# Patient Record
Sex: MALE | Race: WHITE | NOT HISPANIC OR LATINO | URBAN - METROPOLITAN AREA
[De-identification: names, ages, dates, MRNs, and addresses within clinical notes are randomized per-mention and may not be internally consistent; named-entity substitution may affect disease eponyms.]

---

## 2017-08-31 ENCOUNTER — INPATIENT (INPATIENT)
Facility: HOSPITAL | Age: 67
LOS: 14 days | Discharge: NURSING, REHAB OR LONG-TERM CARE | End: 2017-09-15
Attending: INTERNAL MEDICINE | Admitting: SURGERY
Payer: MEDICARE

## 2017-08-31 VITALS
RESPIRATION RATE: 24 BRPM | OXYGEN SATURATION: 100 % | HEART RATE: 127 BPM | DIASTOLIC BLOOD PRESSURE: 79 MMHG | HEIGHT: 70 IN | SYSTOLIC BLOOD PRESSURE: 154 MMHG | WEIGHT: 169.98 LBS

## 2017-08-31 DIAGNOSIS — Z98.890 OTHER SPECIFIED POSTPROCEDURAL STATES: Chronic | ICD-10-CM

## 2017-08-31 LAB
ALBUMIN SERPL ELPH-MCNC: 2.5 G/DL — LOW (ref 3.3–5)
ALP SERPL-CCNC: 208 U/L — HIGH (ref 40–120)
ALT FLD-CCNC: 18 U/L — SIGNIFICANT CHANGE UP (ref 12–78)
ANION GAP SERPL CALC-SCNC: 6 MMOL/L — SIGNIFICANT CHANGE UP (ref 5–17)
ANISOCYTOSIS BLD QL: SLIGHT — SIGNIFICANT CHANGE UP
APTT BLD: 29.8 SEC — SIGNIFICANT CHANGE UP (ref 27.5–37.4)
AST SERPL-CCNC: 16 U/L — SIGNIFICANT CHANGE UP (ref 15–37)
BASO STIPL BLD QL SMEAR: SLIGHT — SIGNIFICANT CHANGE UP
BILIRUB SERPL-MCNC: 0.8 MG/DL — SIGNIFICANT CHANGE UP (ref 0.2–1.2)
BLD GP AB SCN SERPL QL: SIGNIFICANT CHANGE UP
BUN SERPL-MCNC: 41 MG/DL — HIGH (ref 7–23)
CALCIUM SERPL-MCNC: 8.8 MG/DL — SIGNIFICANT CHANGE UP (ref 8.5–10.1)
CHLORIDE SERPL-SCNC: 93 MMOL/L — LOW (ref 96–108)
CO2 SERPL-SCNC: 36 MMOL/L — HIGH (ref 22–31)
CREAT SERPL-MCNC: 1.97 MG/DL — HIGH (ref 0.5–1.3)
ELLIPTOCYTES BLD QL SMEAR: SLIGHT — SIGNIFICANT CHANGE UP
GLUCOSE SERPL-MCNC: 175 MG/DL — HIGH (ref 70–99)
HCT VFR BLD CALC: 34 % — LOW (ref 39–50)
HGB BLD-MCNC: 10.7 G/DL — LOW (ref 13–17)
INR BLD: 1.29 RATIO — HIGH (ref 0.88–1.16)
LACTATE SERPL-SCNC: 1.7 MMOL/L — SIGNIFICANT CHANGE UP (ref 0.7–2)
LG PLATELETS BLD QL AUTO: SLIGHT — SIGNIFICANT CHANGE UP
MACROCYTES BLD QL: SLIGHT — SIGNIFICANT CHANGE UP
MANUAL DIF COMMENT BLD-IMP: SIGNIFICANT CHANGE UP
MCHC RBC-ENTMCNC: 31.5 GM/DL — LOW (ref 32–36)
MCHC RBC-ENTMCNC: 33.2 PG — SIGNIFICANT CHANGE UP (ref 27–34)
MCV RBC AUTO: 105.2 FL — HIGH (ref 80–100)
MICROCYTES BLD QL: SLIGHT — SIGNIFICANT CHANGE UP
MONOCYTES NFR BLD AUTO: 6 % — SIGNIFICANT CHANGE UP (ref 2–14)
NEUTROPHILS NFR BLD AUTO: 90 % — HIGH (ref 43–77)
NEUTS BAND # BLD: 4 % — SIGNIFICANT CHANGE UP (ref 0–8)
NT-PROBNP SERPL-SCNC: 5693 PG/ML — HIGH (ref 0–125)
PLAT MORPH BLD: NORMAL — SIGNIFICANT CHANGE UP
PLATELET # BLD AUTO: 332 K/UL — SIGNIFICANT CHANGE UP (ref 150–400)
POIKILOCYTOSIS BLD QL AUTO: SLIGHT — SIGNIFICANT CHANGE UP
POLYCHROMASIA BLD QL SMEAR: SLIGHT — SIGNIFICANT CHANGE UP
POTASSIUM SERPL-MCNC: 3.7 MMOL/L — SIGNIFICANT CHANGE UP (ref 3.5–5.3)
POTASSIUM SERPL-SCNC: 3.7 MMOL/L — SIGNIFICANT CHANGE UP (ref 3.5–5.3)
PROT SERPL-MCNC: 7.5 GM/DL — SIGNIFICANT CHANGE UP (ref 6–8.3)
PROTHROM AB SERPL-ACNC: 14 SEC — HIGH (ref 9.8–12.7)
RBC # BLD: 3.24 M/UL — LOW (ref 4.2–5.8)
RBC # FLD: 19.5 % — HIGH (ref 10.3–14.5)
RBC BLD AUTO: (no result)
SODIUM SERPL-SCNC: 135 MMOL/L — SIGNIFICANT CHANGE UP (ref 135–145)
TYPE + AB SCN PNL BLD: SIGNIFICANT CHANGE UP
WBC # BLD: 30.1 K/UL — HIGH (ref 3.8–10.5)
WBC # FLD AUTO: 30.1 K/UL — HIGH (ref 3.8–10.5)

## 2017-08-31 PROCEDURE — 99291 CRITICAL CARE FIRST HOUR: CPT

## 2017-08-31 PROCEDURE — 71250 CT THORAX DX C-: CPT | Mod: 26

## 2017-08-31 PROCEDURE — 71010: CPT | Mod: 26

## 2017-08-31 PROCEDURE — 93010 ELECTROCARDIOGRAM REPORT: CPT

## 2017-08-31 RX ORDER — IPRATROPIUM/ALBUTEROL SULFATE 18-103MCG
3 AEROSOL WITH ADAPTER (GRAM) INHALATION ONCE
Qty: 0 | Refills: 0 | Status: COMPLETED | OUTPATIENT
Start: 2017-08-31 | End: 2017-08-31

## 2017-08-31 RX ORDER — METOPROLOL TARTRATE 50 MG
12.5 TABLET ORAL
Qty: 0 | Refills: 0 | Status: DISCONTINUED | OUTPATIENT
Start: 2017-08-31 | End: 2017-09-15

## 2017-08-31 RX ORDER — VANCOMYCIN HCL 1 G
1000 VIAL (EA) INTRAVENOUS ONCE
Qty: 0 | Refills: 0 | Status: DISCONTINUED | OUTPATIENT
Start: 2017-08-31 | End: 2017-08-31

## 2017-08-31 RX ORDER — ALPRAZOLAM 0.25 MG
0.25 TABLET ORAL ONCE
Qty: 0 | Refills: 0 | Status: DISCONTINUED | OUTPATIENT
Start: 2017-08-31 | End: 2017-08-31

## 2017-08-31 RX ORDER — PANTOPRAZOLE SODIUM 20 MG/1
40 TABLET, DELAYED RELEASE ORAL
Qty: 0 | Refills: 0 | Status: DISCONTINUED | OUTPATIENT
Start: 2017-08-31 | End: 2017-09-01

## 2017-08-31 RX ORDER — LEVOTHYROXINE SODIUM 125 MCG
150 TABLET ORAL DAILY
Qty: 0 | Refills: 0 | Status: DISCONTINUED | OUTPATIENT
Start: 2017-08-31 | End: 2017-09-15

## 2017-08-31 RX ORDER — HEPARIN SODIUM 5000 [USP'U]/ML
5000 INJECTION INTRAVENOUS; SUBCUTANEOUS EVERY 12 HOURS
Qty: 0 | Refills: 0 | Status: DISCONTINUED | OUTPATIENT
Start: 2017-08-31 | End: 2017-09-15

## 2017-08-31 RX ORDER — CEFEPIME 1 G/1
1000 INJECTION, POWDER, FOR SOLUTION INTRAMUSCULAR; INTRAVENOUS EVERY 12 HOURS
Qty: 0 | Refills: 0 | Status: DISCONTINUED | OUTPATIENT
Start: 2017-08-31 | End: 2017-09-02

## 2017-08-31 RX ORDER — MAGNESIUM SULFATE 500 MG/ML
1 VIAL (ML) INJECTION ONCE
Qty: 0 | Refills: 0 | Status: COMPLETED | OUTPATIENT
Start: 2017-08-31 | End: 2017-08-31

## 2017-08-31 RX ADMIN — Medication 3 MILLILITER(S): at 14:04

## 2017-08-31 RX ADMIN — CEFEPIME 100 MILLIGRAM(S): 1 INJECTION, POWDER, FOR SOLUTION INTRAMUSCULAR; INTRAVENOUS at 15:50

## 2017-08-31 RX ADMIN — Medication 20 MILLIGRAM(S): at 22:37

## 2017-08-31 RX ADMIN — Medication 0.25 MILLIGRAM(S): at 22:50

## 2017-08-31 RX ADMIN — Medication 125 MILLIGRAM(S): at 14:20

## 2017-08-31 RX ADMIN — Medication 100 GRAM(S): at 14:43

## 2017-08-31 RX ADMIN — Medication 1 MILLIGRAM(S): at 14:30

## 2017-08-31 NOTE — ED PROVIDER NOTE - PROGRESS NOTE DETAILS
Respiratory at bedside. Sister and family at bedside. ENT paged Pt was switched to pressure control ventilation by the respiratory therapist and seems to be more comfortable with good O2 sats. ICU will come to ED to evaluate pt. Pt was recently tx for septic shock. His WBC has doubled form 14 to 30 from the last time it was checked 2 weeks ago. Will administer fluids and broad spectrum abx.

## 2017-08-31 NOTE — PATIENT PROFILE ADULT. - NUTRITION PROFILE
enteral or parenteral nutrition prior to admission/dialysis enteral nutrition evaluation/enteral or parenteral nutrition prior to admission/dialysis

## 2017-08-31 NOTE — H&P ADULT - NSHPPHYSICALEXAM_GEN_ALL_CORE
General - extremely cachetic  neck s/p trach  lungs - high airway pressures, trach repositioned improved  cv rrr  chest radiation changes  abdomen - G tube and Jtube  ext weak

## 2017-08-31 NOTE — H&P ADULT - HISTORY OF PRESENT ILLNESS
This patient is a 67 year old male with PMH significant for:                     Hodgkings Disease treated with Radiation and RT                     CAD - s/p cath in 2009, cabg x 4                     chronic pleural effusion Right chest(s/p VATS 2016)                     aspiration Pneumonia 2016,                      ESRD on Dialysis since 2016                     Rash to vanco, or zoloft on steroid taper                     chronic vent dependence                     hypothyroidism  Today in nursing home developed increased HR with cyanosis sat ok  In ED there was high peak pressures, trach may have been positional but wbc has increased to 30, was 14 at [facility]

## 2017-08-31 NOTE — ED PROVIDER NOTE - OBJECTIVE STATEMENT
67 y/o male with PMHx of ESRD on HD (MWF, dialyzed yesterday), trach, angina, CHF, iron deficiency anemia, Hodgkin's lymphoma, hypothyroidism who is a chronic vent patient presents to the ED BIBEMS from [facility] c/o respiratory distress and cyanosis starting this morning. HR of 160 and RR of 24 at nursing home. Pts trach tube was changed two days ago at [facility]. Pt motions that he has an extra long trach, size 7 Portex. No inner cannula, pt does not know what happened to it. Denies similar sx in the past. J-tube in place. Pt unable to give full hx, hx obtained from EMS and nursing home records.

## 2017-08-31 NOTE — ED PROVIDER NOTE - MUSCULOSKELETAL, MLM
Spine appears normal, range of motion is not limited, no muscle or joint tenderness. +dialysis catheter to right chest.

## 2017-08-31 NOTE — ED PROVIDER NOTE - PMH
Anemia    Angina pectoris    CHF (congestive heart failure)    ESRD on hemodialysis    Hodgkin's lymphoma    Hypothyroidism    Tracheostomy dependent

## 2017-08-31 NOTE — ED ADULT NURSE NOTE - OBJECTIVE STATEMENT
Pt presents from [facility] with respiratory distress. Pt is vent dependant since 5/17. PT had trach changed one week ago, switched from [place] to [place]> Pt went into respiratory distress this morning. ED Respiratory called. Family at bedside.

## 2017-08-31 NOTE — ED PROVIDER NOTE - MEDICAL DECISION MAKING DETAILS
replace trach for better ventilation, will consult ENT stat. replace trach for better ventilation, will consult ENT stat, pulmonary critical care evaluation in ED.  Pt tolerating pressure control ventilation.

## 2017-08-31 NOTE — ED PROVIDER NOTE - RESPIRATORY, MLM
Tachypneic, in moderate respiratory distress. Trach tube in place meeting significant resistance upon ventilating with BVM. Diminished breath sounds B/L.

## 2017-08-31 NOTE — H&P ADULT - NSHPLABSRESULTS_GEN_ALL_CORE
CBC Full  -  ( 31 Aug 2017 14:05 )  WBC Count : 30.1 K/uL  Hemoglobin : 10.7 g/dL  Hematocrit : 34.0 %  Platelet Count - Automated : 332 K/uL  Mean Cell Volume : 105.2 fl  Mean Cell Hemoglobin : 33.2 pg  Mean Cell Hemoglobin Concentration : 31.5 gm/dL  Auto Neutrophil # : x  Auto Lymphocyte # : x  Auto Monocyte # : x  Auto Eosinophil # : x  Auto Basophil # : x  Auto Neutrophil % : 90.0 %  Auto Lymphocyte % : x  Auto Monocyte % : 6.0 %  Auto Eosinophil % : x  Auto Basophil % : x      08-31    135  |  93<L>  |  41<H>  ----------------------------<  175<H>  3.7   |  36<H>  |  1.97<H>    Ca    8.8      31 Aug 2017 14:05    TPro  7.5  /  Alb  2.5<L>  /  TBili  0.8  /  DBili  x   /  AST  16  /  ALT  18  /  AlkPhos  208<H>  08-31

## 2017-08-31 NOTE — ED PROVIDER NOTE - CONSTITUTIONAL, MLM
normal... Ill-appearing, poorly nourished, awake, alert, non verbal but follows commands. In moderate respiratory distress.

## 2017-09-01 LAB
-  CANDIDA ALBICANS: SIGNIFICANT CHANGE UP
-  CANDIDA GLABRATA: SIGNIFICANT CHANGE UP
-  CANDIDA KRUSEI: SIGNIFICANT CHANGE UP
-  CANDIDA PARAPSILOSIS: SIGNIFICANT CHANGE UP
-  CANDIDA TROPICALIS: SIGNIFICANT CHANGE UP
-  COAGULASE NEGATIVE STAPHYLOCOCCUS: SIGNIFICANT CHANGE UP
-  K. PNEUMONIAE GROUP: SIGNIFICANT CHANGE UP
-  KPC RESISTANCE GENE: SIGNIFICANT CHANGE UP
-  STREPTOCOCCUS SP. (NOT GRP A, B OR S PNEUMONIAE): SIGNIFICANT CHANGE UP
A BAUMANNII DNA SPEC QL NAA+PROBE: SIGNIFICANT CHANGE UP
ADD ON TEST-SPECIMEN IN LAB: SIGNIFICANT CHANGE UP
ALBUMIN SERPL ELPH-MCNC: 2.3 G/DL — LOW (ref 3.3–5)
ANION GAP SERPL CALC-SCNC: 10 MMOL/L — SIGNIFICANT CHANGE UP (ref 5–17)
BUN SERPL-MCNC: 49 MG/DL — HIGH (ref 7–23)
CALCIUM SERPL-MCNC: 9 MG/DL — SIGNIFICANT CHANGE UP (ref 8.5–10.1)
CHLORIDE SERPL-SCNC: 94 MMOL/L — LOW (ref 96–108)
CO2 SERPL-SCNC: 32 MMOL/L — HIGH (ref 22–31)
CREAT SERPL-MCNC: 2.25 MG/DL — HIGH (ref 0.5–1.3)
E CLOAC COMP DNA BLD POS QL NAA+PROBE: SIGNIFICANT CHANGE UP
E COLI DNA BLD POS QL NAA+NON-PROBE: SIGNIFICANT CHANGE UP
ENTEROCOC DNA BLD POS QL NAA+NON-PROBE: SIGNIFICANT CHANGE UP
ENTEROCOC DNA BLD POS QL NAA+NON-PROBE: SIGNIFICANT CHANGE UP
GLUCOSE SERPL-MCNC: 107 MG/DL — HIGH (ref 70–99)
GP B STREP DNA BLD POS QL NAA+NON-PROBE: SIGNIFICANT CHANGE UP
GRAM STN FLD: SIGNIFICANT CHANGE UP
HAEM INFLU DNA BLD POS QL NAA+NON-PROBE: SIGNIFICANT CHANGE UP
HAV IGM SER-ACNC: SIGNIFICANT CHANGE UP
HBV CORE IGM SER-ACNC: SIGNIFICANT CHANGE UP
HBV SURFACE AG SER-ACNC: SIGNIFICANT CHANGE UP
HCT VFR BLD CALC: 30.1 % — LOW (ref 39–50)
HCV AB S/CO SERPL IA: 0.19 S/CO — SIGNIFICANT CHANGE UP
HCV AB SERPL-IMP: SIGNIFICANT CHANGE UP
HGB BLD-MCNC: 9.9 G/DL — LOW (ref 13–17)
K OXYTOCA DNA BLD POS QL NAA+NON-PROBE: SIGNIFICANT CHANGE UP
L MONOCYTOG DNA BLD POS QL NAA+NON-PROBE: SIGNIFICANT CHANGE UP
MCHC RBC-ENTMCNC: 33 GM/DL — SIGNIFICANT CHANGE UP (ref 32–36)
MCHC RBC-ENTMCNC: 34.9 PG — HIGH (ref 27–34)
MCV RBC AUTO: 105.8 FL — HIGH (ref 80–100)
METHOD TYPE: SIGNIFICANT CHANGE UP
MRSA SPEC QL CULT: SIGNIFICANT CHANGE UP
MSSA DNA SPEC QL NAA+PROBE: SIGNIFICANT CHANGE UP
N MEN ISLT CULT: SIGNIFICANT CHANGE UP
P AERUGINOSA DNA BLD POS NAA+NON-PROBE: SIGNIFICANT CHANGE UP
PHOSPHATE SERPL-MCNC: 4.3 MG/DL — SIGNIFICANT CHANGE UP (ref 2.5–4.5)
PLATELET # BLD AUTO: 294 K/UL — SIGNIFICANT CHANGE UP (ref 150–400)
POTASSIUM SERPL-MCNC: 4 MMOL/L — SIGNIFICANT CHANGE UP (ref 3.5–5.3)
POTASSIUM SERPL-SCNC: 4 MMOL/L — SIGNIFICANT CHANGE UP (ref 3.5–5.3)
PROTEUS SP DNA BLD POS QL NAA+NON-PROBE: SIGNIFICANT CHANGE UP
RBC # BLD: 2.84 M/UL — LOW (ref 4.2–5.8)
RBC # FLD: 19.3 % — HIGH (ref 10.3–14.5)
S MARCESCENS DNA BLD POS NAA+NON-PROBE: SIGNIFICANT CHANGE UP
S PNEUM DNA BLD POS QL NAA+NON-PROBE: SIGNIFICANT CHANGE UP
S PYO DNA BLD POS QL NAA+NON-PROBE: SIGNIFICANT CHANGE UP
SODIUM SERPL-SCNC: 136 MMOL/L — SIGNIFICANT CHANGE UP (ref 135–145)
SPECIMEN SOURCE: SIGNIFICANT CHANGE UP
SPECIMEN SOURCE: SIGNIFICANT CHANGE UP
WBC # BLD: 21.8 K/UL — HIGH (ref 3.8–10.5)
WBC # FLD AUTO: 21.8 K/UL — HIGH (ref 3.8–10.5)

## 2017-09-01 RX ORDER — PANTOPRAZOLE SODIUM 20 MG/1
40 TABLET, DELAYED RELEASE ORAL DAILY
Qty: 0 | Refills: 0 | Status: DISCONTINUED | OUTPATIENT
Start: 2017-09-01 | End: 2017-09-02

## 2017-09-01 RX ORDER — ALPRAZOLAM 0.25 MG
0.5 TABLET ORAL AT BEDTIME
Qty: 0 | Refills: 0 | Status: DISCONTINUED | OUTPATIENT
Start: 2017-09-01 | End: 2017-09-08

## 2017-09-01 RX ORDER — DAPTOMYCIN 500 MG/10ML
460 INJECTION, POWDER, LYOPHILIZED, FOR SOLUTION INTRAVENOUS
Qty: 0 | Refills: 0 | Status: DISCONTINUED | OUTPATIENT
Start: 2017-09-03 | End: 2017-09-15

## 2017-09-01 RX ORDER — DAPTOMYCIN 500 MG/10ML
INJECTION, POWDER, LYOPHILIZED, FOR SOLUTION INTRAVENOUS
Qty: 0 | Refills: 0 | Status: DISCONTINUED | OUTPATIENT
Start: 2017-09-01 | End: 2017-09-15

## 2017-09-01 RX ORDER — DAPTOMYCIN 500 MG/10ML
460 INJECTION, POWDER, LYOPHILIZED, FOR SOLUTION INTRAVENOUS ONCE
Qty: 0 | Refills: 0 | Status: COMPLETED | OUTPATIENT
Start: 2017-09-01 | End: 2017-09-01

## 2017-09-01 RX ADMIN — DAPTOMYCIN 118.4 MILLIGRAM(S): 500 INJECTION, POWDER, LYOPHILIZED, FOR SOLUTION INTRAVENOUS at 21:38

## 2017-09-01 RX ADMIN — CEFEPIME 100 MILLIGRAM(S): 1 INJECTION, POWDER, FOR SOLUTION INTRAMUSCULAR; INTRAVENOUS at 18:59

## 2017-09-01 RX ADMIN — Medication 0.5 MILLIGRAM(S): at 22:48

## 2017-09-01 RX ADMIN — Medication 150 MICROGRAM(S): at 06:41

## 2017-09-01 RX ADMIN — Medication 50 MILLIGRAM(S): at 06:41

## 2017-09-01 RX ADMIN — Medication 20 MILLIGRAM(S): at 18:43

## 2017-09-01 RX ADMIN — Medication 12.5 MILLIGRAM(S): at 09:38

## 2017-09-01 RX ADMIN — PANTOPRAZOLE SODIUM 40 MILLIGRAM(S): 20 TABLET, DELAYED RELEASE ORAL at 18:43

## 2017-09-01 RX ADMIN — Medication 12.5 MILLIGRAM(S): at 18:43

## 2017-09-01 RX ADMIN — Medication 20 MILLIGRAM(S): at 06:41

## 2017-09-01 NOTE — PROGRESS NOTE ADULT - ASSESSMENT
Patient with sob likely related to trach malpositioning adjusted better this am  has long trach in, Bivonna, will change if any further problems  add water flush to tube feeds  possible infitrate vs. chronic changes on ct right lung, patient refusing cefepime, will check culture  wbc dec. unable to transfer back to [facility] unless wbc better

## 2017-09-01 NOTE — CONSULT NOTE ADULT - SUBJECTIVE AND OBJECTIVE BOX
NEPHROLOGY CONSULT  HPI:  This patient is a 67 year old male with PMH significant for:                     Hodgkings Disease treated with Radiation and RT                     CAD - s/p cath in , cabg x 4                     chronic pleural effusion Right chest(s/p VATS )                     aspiration Pneumonia ,                      ESRD on Dialysis since 2016                     Rash to vanco, or zoloft on steroid taper                     chronic vent dependence                     hypothyroidism  Yesterday nursing home developed increased HR with cyanosis sat ok  In ED there was high peak pressures, trach may have been positional but wbc has increased to 30, was 14 at [facility]..  Admitted for evaluation, needs HD today.  Initially refused, then agreed to 3 hr hd       PAST MEDICAL & SURGICAL HISTORY:  Hypothyroidism  ESRD on hemodialysis  Angina pectoris  Hodgkin's lymphoma  Tracheostomy dependent  Anemia  CHF (congestive heart failure)  H/O tracheostomy      FAMILY HISTORY:  No pertinent family history in first degree relatives      MEDICATIONS  (STANDING):  cefepime  IVPB 1000 milliGRAM(s) IV Intermittent every 12 hours  metoprolol 12.5 milliGRAM(s) Oral two times a day  torsemide 20 milliGRAM(s) Oral two times a day  levothyroxine 150 MICROGram(s) Oral daily  heparin  Injectable 5000 Unit(s) SubCutaneous every 12 hours  predniSONE   Tablet 50 milliGRAM(s) Oral daily  pantoprazole   Suspension 40 milliGRAM(s) Oral daily    MEDICATIONS  (PRN):      Allergies    epinephrine (Unknown)  fentanyl (Unknown)  Reglan (Unknown)  Vancomycin Hydrochloride (Rash)  Zoloft (Unknown)    Intolerances        I&O's Summary    31 Aug 2017 07:01  -  01 Sep 2017 07:00  --------------------------------------------------------  IN: 183 mL / OUT: 1150 mL / NET: -967 mL          REVIEW OF SYSTEMS:    CONSTITUTIONAL:  As per HPI.  Intubated, trached      Vital Signs Last 24 Hrs  T(C): 35.9 (01 Sep 2017 08:49), Max: 36.4 (01 Sep 2017 06:17)  T(F): 96.6 (01 Sep 2017 08:49), Max: 97.5 (01 Sep 2017 06:17)  HR: 89 (31 Aug 2017 23:00) (88 - 127)  BP: 111/70 (31 Aug 2017 23:00) (84/52 - 154/79)  BP(mean): 78 (31 Aug 2017 23:00) (59 - 78)  RR: 17 (31 Aug 2017 23:00) (5 - 24)  SpO2: 100% (31 Aug 2017 23:00) (97% - 100%)  Daily Height in cm: 177.8 (31 Aug 2017 13:24)    Daily Weight in k.9 (01 Sep 2017 06:17)  I&O's Summary    31 Aug 2017 07:01  -  01 Sep 2017 07:00  --------------------------------------------------------  IN: 183 mL / OUT: 1150 mL / NET: -967 mL        PHYSICAL EXAM:    General:  thin, trached    Neuro:  Alert and oriented    HEENT:  No JVD,    Cardiovascular:  Regular rate and rhythm, in the 90s    Lungs:  good air entry    Abdomen:  Normoactive bowel sounds. Soft, flat, non-tender, and non-distended.  No hepatosplenomegaly, positive bowel sounds    Skin:  Warm, dry, well-perfused. small ecchymotic lesions    Extremities:  thin, warm, cachectic    LABS:                        9.9    21.8  )-----------( 294      ( 01 Sep 2017 05:16 )             30.1         136  |  94<L>  |  49<H>  ----------------------------<  107<H>  4.0   |  32<H>  |  2.25<H>    Ca    9.0      01 Sep 2017 05:16  Phos  4.3         TPro  x   /  Alb  2.3<L>  /  TBili  x   /  DBili  x   /  AST  x   /  ALT  x   /  AlkPhos  x       PT/INR - ( 31 Aug 2017 14:05 )   PT: 14.0 sec;   INR: 1.29 ratio         PTT - ( 31 Aug 2017 14:05 )  PTT:29.8 sec    Phosphorus Level, Serum: 4.3 mg/dL ( @ 05:16)

## 2017-09-01 NOTE — CONSULT NOTE ADULT - ASSESSMENT
This patient is a 67 year old male with PMH significant for: Hodgkings Disease treated with Radiation, CAD - s/p cath in 2009, cabg x 4, chronic pleural effusion Right chest, aspiration Pneumonia 2016, ESRD on Dialysis since 2016, Rash to vanco, or zoloft on steroid taper, chronic vent dependence, hypothyroidism..  Yesterday nursing home developed increased HR with cyanosis sat ok  In ED there was high peak pressures, trach may have been positional but wbc has increased to 30, was 14 at [facility]..  Admitted for evaluation, needs HD today.  Initially refused, then agreed to 3 hr hd   Medical management as per ICU  EPO as per outpt orders This patient is a 67 year old male with PMH significant for: Hodgkings Disease treated with Radiation, CAD - s/p cath in 2009, cabg x 4, chronic pleural effusion Right chest, aspiration Pneumonia 2016, ESRD on Dialysis since 2016, Rash to vanco, or zoloft on steroid taper, chronic vent dependence, hypothyroidism..  Yesterday nursing home developed increased HR with cyanosis sat ok  In ED there was high peak pressures, trach may have been positional but wbc has increased to 30, was 14 at [facility]..  Admitted for evaluation, needs HD today.  Initially refused, then agreed to 3 hr hd   Medical management as per ICU  EPO as per outpt orders    9/1 addendum 3:15 pm  seen on hd doing well  vvs

## 2017-09-02 LAB
ANION GAP SERPL CALC-SCNC: 8 MMOL/L — SIGNIFICANT CHANGE UP (ref 5–17)
BUN SERPL-MCNC: 35 MG/DL — HIGH (ref 7–23)
CALCIUM SERPL-MCNC: 8.8 MG/DL — SIGNIFICANT CHANGE UP (ref 8.5–10.1)
CHLORIDE SERPL-SCNC: 95 MMOL/L — LOW (ref 96–108)
CK SERPL-CCNC: 11 U/L — LOW (ref 26–308)
CO2 SERPL-SCNC: 31 MMOL/L — SIGNIFICANT CHANGE UP (ref 22–31)
CREAT SERPL-MCNC: 1.72 MG/DL — HIGH (ref 0.5–1.3)
GLUCOSE SERPL-MCNC: 89 MG/DL — SIGNIFICANT CHANGE UP (ref 70–99)
GRAM STN FLD: SIGNIFICANT CHANGE UP
GRAM STN FLD: SIGNIFICANT CHANGE UP
HCT VFR BLD CALC: 29.4 % — LOW (ref 39–50)
HGB BLD-MCNC: 9.4 G/DL — LOW (ref 13–17)
MCHC RBC-ENTMCNC: 32 GM/DL — SIGNIFICANT CHANGE UP (ref 32–36)
MCHC RBC-ENTMCNC: 33.1 PG — SIGNIFICANT CHANGE UP (ref 27–34)
MCV RBC AUTO: 103.4 FL — HIGH (ref 80–100)
PLATELET # BLD AUTO: 299 K/UL — SIGNIFICANT CHANGE UP (ref 150–400)
POTASSIUM SERPL-MCNC: 3.2 MMOL/L — LOW (ref 3.5–5.3)
POTASSIUM SERPL-SCNC: 3.2 MMOL/L — LOW (ref 3.5–5.3)
RBC # BLD: 2.84 M/UL — LOW (ref 4.2–5.8)
RBC # FLD: 19.6 % — HIGH (ref 10.3–14.5)
SODIUM SERPL-SCNC: 134 MMOL/L — LOW (ref 135–145)
SPECIMEN SOURCE: SIGNIFICANT CHANGE UP
WBC # BLD: 14.8 K/UL — HIGH (ref 3.8–10.5)
WBC # FLD AUTO: 14.8 K/UL — HIGH (ref 3.8–10.5)

## 2017-09-02 PROCEDURE — 93306 TTE W/DOPPLER COMPLETE: CPT | Mod: 26

## 2017-09-02 PROCEDURE — 71010: CPT | Mod: 26

## 2017-09-02 RX ORDER — OXYCODONE AND ACETAMINOPHEN 5; 325 MG/1; MG/1
1 TABLET ORAL EVERY 6 HOURS
Qty: 0 | Refills: 0 | Status: DISCONTINUED | OUTPATIENT
Start: 2017-09-02 | End: 2017-09-09

## 2017-09-02 RX ORDER — POTASSIUM CHLORIDE 20 MEQ
40 PACKET (EA) ORAL EVERY 12 HOURS
Qty: 0 | Refills: 0 | Status: COMPLETED | OUTPATIENT
Start: 2017-09-02 | End: 2017-09-04

## 2017-09-02 RX ORDER — CEFEPIME 1 G/1
1000 INJECTION, POWDER, FOR SOLUTION INTRAMUSCULAR; INTRAVENOUS EVERY 24 HOURS
Qty: 0 | Refills: 0 | Status: DISCONTINUED | OUTPATIENT
Start: 2017-09-02 | End: 2017-09-02

## 2017-09-02 RX ORDER — OMEPRAZOLE 10 MG/1
40 CAPSULE, DELAYED RELEASE ORAL DAILY
Qty: 0 | Refills: 0 | Status: DISCONTINUED | OUTPATIENT
Start: 2017-09-02 | End: 2017-09-15

## 2017-09-02 RX ORDER — CEFEPIME 1 G/1
1000 INJECTION, POWDER, FOR SOLUTION INTRAMUSCULAR; INTRAVENOUS DAILY
Qty: 0 | Refills: 0 | Status: DISCONTINUED | OUTPATIENT
Start: 2017-09-02 | End: 2017-09-03

## 2017-09-02 RX ADMIN — Medication 0.5 MILLIGRAM(S): at 22:28

## 2017-09-02 RX ADMIN — CEFEPIME 100 MILLIGRAM(S): 1 INJECTION, POWDER, FOR SOLUTION INTRAMUSCULAR; INTRAVENOUS at 05:11

## 2017-09-02 RX ADMIN — Medication 20 MILLIGRAM(S): at 12:35

## 2017-09-02 RX ADMIN — Medication 50 MILLIGRAM(S): at 06:36

## 2017-09-02 RX ADMIN — Medication 150 MICROGRAM(S): at 06:36

## 2017-09-02 RX ADMIN — OMEPRAZOLE 40 MILLIGRAM(S): 10 CAPSULE, DELAYED RELEASE ORAL at 17:12

## 2017-09-02 RX ADMIN — Medication 40 MILLIEQUIVALENT(S): at 17:12

## 2017-09-02 RX ADMIN — Medication 12.5 MILLIGRAM(S): at 06:36

## 2017-09-02 RX ADMIN — OXYCODONE AND ACETAMINOPHEN 1 TABLET(S): 5; 325 TABLET ORAL at 23:33

## 2017-09-02 RX ADMIN — Medication 12.5 MILLIGRAM(S): at 17:12

## 2017-09-02 RX ADMIN — OXYCODONE AND ACETAMINOPHEN 1 TABLET(S): 5; 325 TABLET ORAL at 23:00

## 2017-09-02 NOTE — PROGRESS NOTE ADULT - SUBJECTIVE AND OBJECTIVE BOX
NEPHROLOGY INTERVAL HPI/OVERNIGHT EVENTS:  9/2 SY  S/p HD yesterday.  Now positive blood culture with G + in clusters.  Pt fully alert and responsive.  Understands the need to remove permcath.  Reeducated pt to limit po water intake.      HPI:  This patient is a 67 year old male with PMH significant for:                     Hodgkings Disease treated with Radiation and RT                     CAD - s/p cath in 2009, cabg x 4                     chronic pleural effusion Right chest(s/p VATS 2016)                     aspiration Pneumonia 2016,                      ESRD on Dialysis since 2016                     Rash to vanco, or zoloft on steroid taper                     chronic vent dependence                     hypothyroidism  Yesterday nursing home developed increased HR with cyanosis sat ok  In ED there was high peak pressures, trach may have been positional but wbc has increased to 30, was 14 at [facility]..  Admitted for evaluation, needs HD today.  Initially refused, then agreed to 3 hr hd       PAST MEDICAL & SURGICAL HISTORY:  Hypothyroidism  ESRD on hemodialysis  Angina pectoris  Hodgkin's lymphoma  Tracheostomy dependent  Anemia  CHF (congestive heart failure)  H/O tracheostomy        MEDICATIONS  (STANDING):  metoprolol 12.5 milliGRAM(s) Oral two times a day  levothyroxine 150 MICROGram(s) Oral daily  heparin  Injectable 5000 Unit(s) SubCutaneous every 12 hours  predniSONE   Tablet 50 milliGRAM(s) Oral daily  pantoprazole   Suspension 40 milliGRAM(s) Oral daily  DAPTOmycin IVPB   IV Intermittent   torsemide 40 milliGRAM(s) Oral daily  cefepime  IVPB 1000 milliGRAM(s) IV Intermittent every 24 hours    MEDICATIONS  (PRN):  ALPRAZolam 0.5 milliGRAM(s) Oral at bedtime PRN Sleep          Vital Signs Last 24 Hrs  T(C): 37.1 (01 Sep 2017 17:55), Max: 37.1 (01 Sep 2017 14:30)  T(F): 98.8 (01 Sep 2017 17:55), Max: 98.8 (01 Sep 2017 14:30)  HR: 90 (02 Sep 2017 07:00) (81 - 107)  BP: 100/75 (02 Sep 2017 07:00) (83/59 - 122/84)  BP(mean): 81 (02 Sep 2017 07:00) (64 - 97)  RR: 24 (02 Sep 2017 07:00) (0 - 31)  SpO2: 100% (01 Sep 2017 14:30) (100% - 100%)  Daily     Daily     09-01 @ 07:01  -  09-02 @ 07:00  --------------------------------------------------------  IN: 1000 mL / OUT: 600 mL / NET: 400 mL        PHYSICAL EXAM:  Alert on Trach/Vent  GENERAL: able to communicate by writing.  CHEST/LUNG: fair air entry  HEART: S1S2 tachy  ABDOMEN: soft  EXTREMITIES: trace edema  SKIN:     LABS:                        9.4    14.8  )-----------( 299      ( 02 Sep 2017 06:38 )             29.4     09-02    134<L>  |  95<L>  |  35<H>  ----------------------------<  89  3.2<L>   |  31  |  1.72<H>    Ca    8.8      02 Sep 2017 06:38  Phos  4.3     09-01    TPro  x   /  Alb  2.3<L>  /  TBili  x   /  DBili  x   /  AST  x   /  ALT  x   /  AlkPhos  x   09-01    PT/INR - ( 31 Aug 2017 14:05 )   PT: 14.0 sec;   INR: 1.29 ratio         PTT - ( 31 Aug 2017 14:05 )  PTT:29.8 sec            RADIOLOGY & ADDITIONAL TESTS:

## 2017-09-02 NOTE — DIETITIAN INITIAL EVALUATION ADULT. - OTHER INFO
Consult for enteral feeding and Dialysis. Pt with trach and PEJ. Pt on perative TF at [facility] (1440 ml total volume). Recommend changing to Pivot 1.5 at 50 ml/hr over 24 hours (matches prior TF).

## 2017-09-02 NOTE — PROGRESS NOTE ADULT - SUBJECTIVE AND OBJECTIVE BOX
HPI:  This patient is a 67 year old male with PMH significant for:                     Hodgkings Disease treated with Radiation and RT                     CAD - s/p cath in 2009, cabg x 4                     chronic pleural effusion Right chest(s/p VATS 2016)                     aspiration Pneumonia 2016,                      ESRD on Dialysis since 2016                     Rash to vanco, or zoloft on steroid taper                     chronic vent dependence   Transferred to  on 8/31 with difficulty ventilating from vent and also found to have inc wbc   trach issue was likely positional   however blood culture overnight are now positive for MRSA 4/4 bottles from admission   Patient has dialysis catheter and right effusion whcih is chronic         PMH:  As above.                    MEDICATIONS  (STANDING):  cefepime  IVPB 1000 milliGRAM(s) IV Intermittent every 12 hours  metoprolol 12.5 milliGRAM(s) Oral two times a day  levothyroxine 150 MICROGram(s) Oral daily  heparin  Injectable 5000 Unit(s) SubCutaneous every 12 hours  predniSONE   Tablet 50 milliGRAM(s) Oral daily  pantoprazole   Suspension 40 milliGRAM(s) Oral daily  DAPTOmycin IVPB   IV Intermittent   torsemide 40 milliGRAM(s) Oral daily    MEDICATIONS  (PRN):  ALPRAZolam 0.5 milliGRAM(s) Oral at bedtime PRN Sleep              General: awake, alert, writing, frail week  HEENT has bivonna trach, right IJ tunneled catheter  respiratory: lungs clear  CV rrr  abdomen - s/p j tube , g tube sligth drainage around. g tube  ext scaley skin      ICU Vital Signs Last 24 Hrs  T(C): 37.1 (01 Sep 2017 17:55), Max: 37.1 (01 Sep 2017 14:30)  T(F): 98.8 (01 Sep 2017 17:55), Max: 98.8 (01 Sep 2017 14:30)  HR: 90 (02 Sep 2017 07:00) (81 - 107)  BP: 100/75 (02 Sep 2017 07:00) (83/59 - 122/84)  BP(mean): 81 (02 Sep 2017 07:00) (64 - 97)  ABP: --  ABP(mean): --  RR: 24 (02 Sep 2017 07:00) (0 - 31)  SpO2: 100% (01 Sep 2017 14:30) (100% - 100%)      Mode: AC/ CMV (Assist Control/ Continuous Mandatory Ventilation)  RR (machine): 14  TV (machine): 400  FiO2: 40  PEEP: 5  PS: 5  ITime: 1  PIP: 30      I&O's Summary    01 Sep 2017 07:01  -  02 Sep 2017 07:00  --------------------------------------------------------  IN: 1000 mL / OUT: 600 mL / NET: 400 mL                                   9.4    14.8  )-----------( 299      ( 02 Sep 2017 06:38 )             29.4       09-02    134<L>  |  95<L>  |  35<H>  ----------------------------<  89  3.2<L>   |  31  |  1.72<H>    Ca    8.8      02 Sep 2017 06:38  Phos  4.3     09-01    TPro  x   /  Alb  2.3<L>  /  TBili  x   /  DBili  x   /  AST  x   /  ALT  x   /  AlkPhos  x   09-01      CARDIAC MARKERS ( 02 Sep 2017 06:38 )  x     / x     / 11 U/L / x     / x                    DVT Prophylaxis:    heparin sub q                                                           Advanced Directives: Full code

## 2017-09-02 NOTE — PROGRESS NOTE ADULT - ASSESSMENT
1. patient with difficult ventilating improved with trach manipulation  2. chronic right pleural effusion  3. now bacteremia 4 bottles of mrsa  4. possible pneumonia with gram positive and gram negative rods    cotninue on vent  will need dialysis catheter removed  cotninmayte cubicin and cefepime  '  d/w ID and nephrology

## 2017-09-02 NOTE — PROGRESS NOTE ADULT - SUBJECTIVE AND OBJECTIVE BOX
Asked by Dr. Heredia to remove Right IJ HD Catheter. 65 y/o M s/p Right IJ placed in [facility] 3 month ago for HD.    Procedure: Hemodialysis Catheter Removal  Indication: Sepsis  Site: Right IJ  Consent: in chart  After verifying correct patient, procedure, site, positioning, and special equipment if applicable. The patient was placed in a 45 degree position The patient’s right  neck was prepped and draped in sterile fashion. 1% Lidocaine was used to anesthetize the surrounding of cath skin area. Then  make an incision over the cuff area to release fibrotic tissue. Made 2nd incision further to proximal of cath then release all scar tissue around the cath. Then removed the catheter smoothly and found intact tip. 20 minute digital pressure applied proximal to cut down area for hemostasis. Applied DSD with compressed gauze for hemostasis.  Pt tolerated the procedure well. Estimated Blood Loss: Minimal    F/U CXR Asked by Dr. Heredia to remove Right IJ HD Catheter. 65 y/o M s/p Right IJ placed in [facility] 3 month ago for HD.    Procedure: Hemodialysis Catheter Removal  Indication: Sepsis  Site: Right IJ  Consent: in chart  After verifying correct patient, procedure, site, positioning, and special equipment if applicable. The patient was placed in a 45 degree position The patient’s right  neck was prepped and draped in sterile fashion. 1% Lidocaine was used to anesthetize the surrounding of cath skin area. Then  make an incision over the cuff area to release fibrotic tissue. Made 2nd incision further to proximal of cath then release all scar tissue around the cath. Then removed the catheter smoothly and found intact tip. 20 minute digital pressure applied proximal to cut down area for hemostasis. Closed the incision site with 3-0 Nylon 2 in each incision. Exit site and incision site covered with DSD. Pt tolerated the procedure well. Estimated Blood Loss: Minimal    F/U CXR

## 2017-09-02 NOTE — CONSULT NOTE ADULT - ASSESSMENT
This patient is a 67 year old male with PMH of Hodgkins disease s/p XRT/chemo, CAD s/p cath 2009, CABG X 4, chronic R pleural effusion s/p VATS 206, asp , ESRD on HD since May 2016, chronic resp failure s/p trach, vent dependent, hypothyroidism admitted 8/31 from NH where he developed tachycardia and cyanosis, was saturating normally, not hypoxic but was sent for further eval, here afebrile, wbc ct elevated to 30, CT chest showed moderate R loculated hydropneumothorax with pleural thickening/ L pleural effusion/pleural thickening, 4 bottles blood cx growing MRSA, + perm catheter in place for past 2 months, has vanco allergy, was given IV dapto/cefepime.     1. MRSA sepsis/probable tessio-related infection/chronic resp failure s/p trach/R loculated hydropneumothorax/L pleural effusion/ESRD/immunocompromised host  - vancomycin allergic  - slowly improving, no fevers, wbc ct to 14  - blood cx with MRSA 4 bottles  - recommend removal of perm-catheter  - continue with daptomycin  460mg q48h, post dialysis on HD days, cpk weekly check (baseline normal) #2  - continue with cefepime, decreased dose to 1gm daily for lung coverage #3  - has R hydropneumothorax/loculated effusion but likely chronic than acute, hx of prior VATS  - if any worsening in resp status, recommend thoracentesis/drainage of effusion  - repeat blood cx in am  - f/u cbc  - trach care  - -tolerating abx well so far; no side effects noted  -reason for abx use and side effects reviewed with patient  - supportive care

## 2017-09-02 NOTE — CONSULT NOTE ADULT - SUBJECTIVE AND OBJECTIVE BOX
Patient is a 66y old  Male who presents with a chief complaint of sob, difficulty ventilating (31 Aug 2017 16:24)      HPI:  This patient is a 67 year old male with PMH of Hodgkins disease s/p XRT/chemo, CAD s/p cath 2009, CABG X 4, chronic R pleural effusion s/p VATS 206, asp , ESRD on HD since May 2016, chronic resp failure s/p trach, vent dependent, hypothyroidism admitted 8/31 from NH where he developed tachycardia and cyanosis, was saturating normally, not hypoxic but was sent for further eval, here afebrile, wbc ct elevated to 30, CT chest showed moderate R loculated hydopneumothorax with pleural thickening/ L pleural effusion/pleural thickening, 4 bottles blood cx growing MRSA, + perm catheter in place for past 2 months, has vanco allergy, was given IV dapto/cefepime.                     PMH: as above    PSH: as above    Meds: per reconciliation sheet, noted below    MEDICATIONS  (STANDING):  metoprolol 12.5 milliGRAM(s) Oral two times a day  levothyroxine 150 MICROGram(s) Oral daily  heparin  Injectable 5000 Unit(s) SubCutaneous every 12 hours  predniSONE   Tablet 50 milliGRAM(s) Oral daily  pantoprazole   Suspension 40 milliGRAM(s) Oral daily  DAPTOmycin IVPB   IV Intermittent   torsemide 40 milliGRAM(s) Oral daily  cefepime  IVPB 1000 milliGRAM(s) IV Intermittent daily      Allergies    epinephrine (Unknown)  fentanyl (Unknown)  Reglan (Unknown)  Vancomycin Hydrochloride (Rash)  Zoloft (Unknown)    Intolerances        Social: no smoking, no alcohol, no illegal drugs; no recent travel, no exposure to TB    Family history:  No pertinent family history in first degree relatives      ROS: the patient denies fever, no chills, no HA, no dizziness, no sore throat, no blurry vision, no CP, no palpitations,  no abdominal pain, no diarrhea, no N/V, no dysuria, no leg pain, no claudication,  no rectal pain or bleeding, no night sweats    Vital Signs Last 24 Hrs  T(C): 37.1 (01 Sep 2017 17:55), Max: 37.1 (01 Sep 2017 14:30)  T(F): 98.8 (01 Sep 2017 17:55), Max: 98.8 (01 Sep 2017 14:30)  HR: 93 (02 Sep 2017 11:00) (85 - 107)  BP: 98/61 (02 Sep 2017 08:00) (83/59 - 122/84)  BP(mean): 69 (02 Sep 2017 08:00) (64 - 97)  RR: 27 (02 Sep 2017 11:00) (0 - 31)  SpO2: 100% (02 Sep 2017 11:00) (100% - 100%)      PE:  Constitutional: frail looking, + trach   HEENT: NC/AT, EOMI, PERRLA  Neck: supple  Back: no tenderness  Respiratory: decreased breath sounds  Cardiovascular: S1S2 regular, no murmurs  Abdomen: soft, not tender, + gtube in place with some drainage  Genitourinary: deferred  Rectal: deferred  Musculoskeletal: no muscle tenderness, no joint swelling or tenderness  Extremities: no pedal edema  Neurological:  no focal deficits  Skin: no rashes, R perm-cath in place c/d/i    Labs:                        9.4    14.8  )-----------( 299      ( 02 Sep 2017 06:38 )             29.4     09-02    134<L>  |  95<L>  |  35<H>  ----------------------------<  89  3.2<L>   |  31  |  1.72<H>    Ca    8.8      02 Sep 2017 06:38  Phos  4.3     09-01    TPro  x   /  Alb  2.3<L>  /  TBili  x   /  DBili  x   /  AST  x   /  ALT  x   /  AlkPhos  x   09-01     LIVER FUNCTIONS - ( 01 Sep 2017 05:16 )  Alb: 2.3 g/dL / Pro: x     / ALK PHOS: x     / ALT: x     / AST: x     / GGT: x           Culture - Blood (08.31.17 @ 17:29)    Gram Stain:   Growth in aerobic bottle: Gram Positive Cocci in Clusters  Growth in anaerobic bottle: Gram Positive Cocci in Clusters    Specimen Source: .Blood Blood    Culture Results:   Growth in aerobic bottle: Gram Positive Cocci in Clusters  Growth in anaerobic bottle: Gram Positive Cocci in Clusters    Culture - Blood (08.31.17 @ 17:29)    -  Methicillin resistant Staphylococcus aureus (MRSA): Detec    -  Candida parapsilosis: Nondet    -  Coagulase negative Staphylococcus: Nondet    -  Enterobacter cloacae complex: Nondet    -  Enterococcus species: Nondet    -  Escherichia coli: Nondet    -  Haemophilus influenzae: Nondet    -  Neisseria meningitidis: Nondet    -  Proteus species: Nondet    -  Pseudomonas aeruginosa: Nondet    -  Streptococcus agalactiae (Group B): Nondet    -  Acinetobacter baumanii: Nondet    -  Candida albicans: Nondet    -  Serratia marcescens: Nondet    -  Streptococcus pneumoniae: Nondet    -  Vancomycin resistant Enterococcus sp.: Nondet    Gram Stain:   Growth in aerobic bottle: Gram Positive Cocci in Clusters  Growth in anaerobic bottle: Gram Positive Cocci in Clusters    -  Candida glabrata: Nondet    -  Candida krusei: Nondet    -  Candida tropicalis: Nondet    -  Klebsiella oxytoca: Nondet    -  Klebsiella pneumoniae: Nondet    -  Listeria monocytogenes: Nondet    -  Multidrug (KPC pos) resistant organism: Nondet    -  Staphylococcus aureus: Nondet    -  Streptococcus pyogenes (Group A): Nondet    -  Streptococcus sp. (Not Grp A, B or S pneumoniae): Nondet    Specimen Source: .Blood Blood    Organism: Blood Culture PCR    Culture Results:   Growth in aerobic and anaerobic bottles: Staphylococcus aureus  ***Blood Panel PCR results on this specimen are available  approximately 3 hours after the Gram stain result.***  Gram stain, PCR, and/or culture results may not always  correspond dueto difference in methodologies.    Organism Identification: Blood Culture PCR    Method Type: PCR            Radiology: < from: CT Chest No Cont (08.31.17 @ 17:58) >  EXAM:  CT CHEST                            PROCEDURE DATE:  08/31/2017          INTERPRETATION:  CT CHEST    HISTORY:  respiratory failure                     TECHNIQUE: Noncontrast CT of the chest was performed. Coronal and   sagittal images were reconstructed. This study was performed using   automatic exposure control (radiation dose reduction software) to obtain   a diagnostic image quality scan with patient dose as low as reasonably   achievable.    COMPARISON: Chest x-ray from the same day    FINDINGS:    LUNGS, AIRWAYS: Tracheostomy in place. The central airways are patent.   Mild interstitial and alveolar pulmonary edema. Bibasilar compressive   atelectasis with near complete collapse of both lower lobes.    PLEURA: Moderate loculated right hydropneumothorax pleural thickening.   Moderate layering left pleural effusion.    HEART AND VESSELS: Status post CABG. Right IJ dialysis catheter tip in   the right atrium. Normal heart size. No pericardial effusion. Extensive   aortic valve and coronary calcification. Atherosclerotic thoracic aorta   is normal in caliber.    MEDIASTINUM AND VIKI: No adenopathy.    UPPER ABDOMEN: Small ascites. Gallstones. Percutaneous gastrostomy tube   in place. Splenectomy.    BONES AND SOFT TISSUES:Status post sternotomy. No acute bony abnormality.    IMPRESSION:     Mild pulmonary interstitial and alveolar edema.    Near-complete collapse of both lower lobes secondary to compression   atelectasis.    Moderate loculated right hydropneumothorax with associated pleural   thickening. Moderate layering left pleural effusion with associated   pleural thickening.      < end of copied text >      Advanced directives addressed: full resuscitation

## 2017-09-02 NOTE — PROGRESS NOTE ADULT - ASSESSMENT
This patient is a 67 year old male with PMH significant for: Hodgkings Disease treated with Radiation, CAD - s/p cath in 2009, cabg x 4, chronic pleural effusion Right chest, aspiration Pneumonia 2016, ESRD on Dialysis since 2016, Rash to vanco, or zoloft on steroid taper, chronic vent dependence, hypothyroidism..  Yesterday nursing home developed increased HR with cyanosis sat ok  In ED there was high peak pressures, trach may have been positional but wbc has increased to 30, was 14 at [facility]..  Admitted for evaluation, needs HD today.  Initially refused, then agreed to 3 hr hd   Medical management as per ICU  EPO as per outpt orders    9/1 addendum 3:15 pm  seen on hd doing well    9/2 SY  --Pt with APRYL on HD since 5/2017.  Unclear if renal recovery can be expected.   Pt's pre HD creat was only 3.1.  Now found with positive blood culture with gram positive cocci.   Most likely line sepsis.  Will remove catheter today.  Will continue to monitor for any renal recovery.  Limit free water intake to prevent hyponatremia.  Continue diuretics.

## 2017-09-03 LAB
-  AMPICILLIN/SULBACTAM: SIGNIFICANT CHANGE UP
-  AMPICILLIN/SULBACTAM: SIGNIFICANT CHANGE UP
-  CEFAZOLIN: SIGNIFICANT CHANGE UP
-  CEFAZOLIN: SIGNIFICANT CHANGE UP
-  CEFTAZIDIME: SIGNIFICANT CHANGE UP
-  CIPROFLOXACIN: SIGNIFICANT CHANGE UP
-  CIPROFLOXACIN: SIGNIFICANT CHANGE UP
-  CLINDAMYCIN: SIGNIFICANT CHANGE UP
-  CLINDAMYCIN: SIGNIFICANT CHANGE UP
-  DAPTOMYCIN: SIGNIFICANT CHANGE UP
-  ERYTHROMYCIN: SIGNIFICANT CHANGE UP
-  ERYTHROMYCIN: SIGNIFICANT CHANGE UP
-  GENTAMICIN: SIGNIFICANT CHANGE UP
-  GENTAMICIN: SIGNIFICANT CHANGE UP
-  LEVOFLOXACIN: SIGNIFICANT CHANGE UP
-  LINEZOLID: SIGNIFICANT CHANGE UP
-  LINEZOLID: SIGNIFICANT CHANGE UP
-  MOXIFLOXACIN(AEROBIC): SIGNIFICANT CHANGE UP
-  MOXIFLOXACIN(AEROBIC): SIGNIFICANT CHANGE UP
-  OXACILLIN: SIGNIFICANT CHANGE UP
-  OXACILLIN: SIGNIFICANT CHANGE UP
-  PENICILLIN: SIGNIFICANT CHANGE UP
-  PENICILLIN: SIGNIFICANT CHANGE UP
-  RIFAMPIN: SIGNIFICANT CHANGE UP
-  RIFAMPIN: SIGNIFICANT CHANGE UP
-  TETRACYCLINE: SIGNIFICANT CHANGE UP
-  TETRACYCLINE: SIGNIFICANT CHANGE UP
-  TRIMETHOPRIM/SULFAMETHOXAZOLE: SIGNIFICANT CHANGE UP
-  VANCOMYCIN: SIGNIFICANT CHANGE UP
-  VANCOMYCIN: SIGNIFICANT CHANGE UP
ALBUMIN SERPL ELPH-MCNC: 2.1 G/DL — LOW (ref 3.3–5)
ANION GAP SERPL CALC-SCNC: 6 MMOL/L — SIGNIFICANT CHANGE UP (ref 5–17)
BUN SERPL-MCNC: 51 MG/DL — HIGH (ref 7–23)
CALCIUM SERPL-MCNC: 8.9 MG/DL — SIGNIFICANT CHANGE UP (ref 8.5–10.1)
CHLORIDE SERPL-SCNC: 96 MMOL/L — SIGNIFICANT CHANGE UP (ref 96–108)
CO2 SERPL-SCNC: 34 MMOL/L — HIGH (ref 22–31)
CREAT SERPL-MCNC: 2.2 MG/DL — HIGH (ref 0.5–1.3)
CULTURE RESULTS: SIGNIFICANT CHANGE UP
GLUCOSE SERPL-MCNC: 118 MG/DL — HIGH (ref 70–99)
HCT VFR BLD CALC: 28.8 % — LOW (ref 39–50)
HGB BLD-MCNC: 9.4 G/DL — LOW (ref 13–17)
MCHC RBC-ENTMCNC: 32.5 GM/DL — SIGNIFICANT CHANGE UP (ref 32–36)
MCHC RBC-ENTMCNC: 33.7 PG — SIGNIFICANT CHANGE UP (ref 27–34)
MCV RBC AUTO: 103.8 FL — HIGH (ref 80–100)
METHOD TYPE: SIGNIFICANT CHANGE UP
ORGANISM # SPEC MICROSCOPIC CNT: SIGNIFICANT CHANGE UP
PHOSPHATE SERPL-MCNC: 1.7 MG/DL — LOW (ref 2.5–4.5)
PLATELET # BLD AUTO: 352 K/UL — SIGNIFICANT CHANGE UP (ref 150–400)
POTASSIUM SERPL-MCNC: 3.5 MMOL/L — SIGNIFICANT CHANGE UP (ref 3.5–5.3)
POTASSIUM SERPL-SCNC: 3.5 MMOL/L — SIGNIFICANT CHANGE UP (ref 3.5–5.3)
RBC # BLD: 2.78 M/UL — LOW (ref 4.2–5.8)
RBC # FLD: 19.3 % — HIGH (ref 10.3–14.5)
SODIUM SERPL-SCNC: 136 MMOL/L — SIGNIFICANT CHANGE UP (ref 135–145)
SPECIMEN SOURCE: SIGNIFICANT CHANGE UP
WBC # BLD: 10.6 K/UL — HIGH (ref 3.8–10.5)
WBC # FLD AUTO: 10.6 K/UL — HIGH (ref 3.8–10.5)

## 2017-09-03 RX ORDER — CEFTAZIDIME 6 G/30ML
1 INJECTION, POWDER, FOR SOLUTION INTRAVENOUS ONCE
Qty: 0 | Refills: 0 | Status: COMPLETED | OUTPATIENT
Start: 2017-09-04 | End: 2017-09-04

## 2017-09-03 RX ADMIN — DAPTOMYCIN 118.4 MILLIGRAM(S): 500 INJECTION, POWDER, LYOPHILIZED, FOR SOLUTION INTRAVENOUS at 17:22

## 2017-09-03 RX ADMIN — Medication 50 MILLIGRAM(S): at 06:45

## 2017-09-03 RX ADMIN — Medication 40 MILLIGRAM(S): at 06:46

## 2017-09-03 RX ADMIN — OXYCODONE AND ACETAMINOPHEN 1 TABLET(S): 5; 325 TABLET ORAL at 22:32

## 2017-09-03 RX ADMIN — CEFEPIME 100 MILLIGRAM(S): 1 INJECTION, POWDER, FOR SOLUTION INTRAMUSCULAR; INTRAVENOUS at 06:46

## 2017-09-03 RX ADMIN — Medication 12.5 MILLIGRAM(S): at 10:23

## 2017-09-03 RX ADMIN — Medication 12.5 MILLIGRAM(S): at 17:21

## 2017-09-03 RX ADMIN — OMEPRAZOLE 40 MILLIGRAM(S): 10 CAPSULE, DELAYED RELEASE ORAL at 13:01

## 2017-09-03 RX ADMIN — Medication 40 MILLIEQUIVALENT(S): at 06:45

## 2017-09-03 RX ADMIN — Medication 0.5 MILLIGRAM(S): at 22:32

## 2017-09-03 RX ADMIN — OXYCODONE AND ACETAMINOPHEN 1 TABLET(S): 5; 325 TABLET ORAL at 00:24

## 2017-09-03 RX ADMIN — CEFEPIME 100 MILLIGRAM(S): 1 INJECTION, POWDER, FOR SOLUTION INTRAMUSCULAR; INTRAVENOUS at 11:43

## 2017-09-03 RX ADMIN — Medication 150 MICROGRAM(S): at 06:46

## 2017-09-03 NOTE — PROGRESS NOTE ADULT - SUBJECTIVE AND OBJECTIVE BOX
NEPHROLOGY INTERVAL HPI/OVERNIGHT EVENTS:  9/3 SY  Permcath removed due to MRSA bacteremia.  No acute events.  Alert and responsive on vent/trach.     SY  S/p HD yesterday.  Now positive blood culture with G + in clusters.  Pt fully alert and responsive.  Understands the need to remove permcath.  Reeducated pt to limit po water intake.      HPI:  This patient is a 67 year old male with PMH significant for:                     Hodgkings Disease treated with Radiation and RT                     CAD - s/p cath in , cabg x 4                     chronic pleural effusion Right chest(s/p VATS )                     aspiration Pneumonia ,                      ESRD on Dialysis since                      Rash to vanco, or zoloft on steroid taper                     chronic vent dependence                     hypothyroidism  Yesterday nursing home developed increased HR with cyanosis sat ok  In ED there was high peak pressures, trach may have been positional but wbc has increased to 30, was 14 at [facility]..  Admitted for evaluation, needs HD today.  Initially refused, then agreed to 3 hr hd       PAST MEDICAL & SURGICAL HISTORY:  Hypothyroidism  ESRD on hemodialysis  Angina pectoris  Hodgkin's lymphoma  Tracheostomy dependent  Anemia  CHF (congestive heart failure)  H/O tracheostomy    MEDICATIONS  (STANDING):  metoprolol 12.5 milliGRAM(s) Oral two times a day  levothyroxine 150 MICROGram(s) Oral daily  heparin  Injectable 5000 Unit(s) SubCutaneous every 12 hours  predniSONE   Tablet 50 milliGRAM(s) Oral daily  DAPTOmycin IVPB   IV Intermittent   DAPTOmycin IVPB 460 milliGRAM(s) IV Intermittent every 48 hours  torsemide 40 milliGRAM(s) Oral daily  cefepime  IVPB 1000 milliGRAM(s) IV Intermittent daily  omeprazole Suspension 40 milliGRAM(s) Oral daily  potassium chloride   Powder 40 milliEquivalent(s) Oral every 12 hours    MEDICATIONS  (PRN):  ALPRAZolam 0.5 milliGRAM(s) Oral at bedtime PRN Sleep  oxyCODONE    5 mG/acetaminophen 325 mG 1 Tablet(s) Oral every 6 hours PRN Moderate Pain (4 - 6)          Vital Signs Last 24 Hrs  T(C): 36.3 (03 Sep 2017 08:29), Max: 36.3 (03 Sep 2017 08:29)  T(F): 97.4 (03 Sep 2017 08:29), Max: 97.4 (03 Sep 2017 08:29)  HR: 98 (03 Sep 2017 08:29) (83 - 109)  BP: 91/50 (03 Sep 2017 08:00) (81/47 - 107/66)  BP(mean): 60 (03 Sep 2017 08:00) (55 - 75)  RR: 19 (03 Sep 2017 08:29) (0 - 35)  SpO2: 100% (03 Sep 2017 06:00) (96% - 100%)  Daily     Daily Weight in k.9 (02 Sep 2017 12:49)     @ 07:01  -   @ 07:00  --------------------------------------------------------  IN: 840 mL / OUT: 1331 mL / NET: -491 mL        PHYSICAL EXAM:  Alert and appropriate  GENERAL: On trach /vent   able to communicate by ipad  CHEST/LUNG: scattered rhonchi  HEART: S1S2 tachy  ABDOMEN: soft  EXTREMITIES: positive edema  SKIN:     LABS:                        9.4    10.6  )-----------( 352      ( 03 Sep 2017 05:56 )             28.8         136  |  96  |  51<H>  ----------------------------<  118<H>  3.5   |  34<H>  |  2.20<H>    Ca    8.9      03 Sep 2017 05:56  Phos  1.7         TPro  x   /  Alb  2.1<L>  /  TBili  x   /  DBili  x   /  AST  x   /  ALT  x   /  AlkPhos  x           Phosphorus Level, Serum: 1.7 mg/dL ( @ 05:56)          RADIOLOGY & ADDITIONAL TESTS:

## 2017-09-03 NOTE — PROGRESS NOTE ADULT - ASSESSMENT
This patient is a 67 year old male with PMH significant for: Hodgkings Disease treated with Radiation, CAD - s/p cath in 2009, cabg x 4, chronic pleural effusion Right chest, aspiration Pneumonia 2016, ESRD on Dialysis since 2016, Rash to vanco, or zoloft on steroid taper, chronic vent dependence, hypothyroidism..  Yesterday nursing home developed increased HR with cyanosis sat ok  In ED there was high peak pressures, trach may have been positional but wbc has increased to 30, was 14 at [facility]..  Admitted for evaluation, needs HD today.  Initially refused, then agreed to 3 hr hd   Medical management as per ICU  EPO as per outpt orders    9/1 addendum 3:15 pm  seen on hd doing well    9/2 SY  --Pt with APRYL on HD since 5/2017.  Unclear if renal recovery can be expected.   Pt's pre HD creat was only 3.1.  Now found with positive blood culture with gram positive cocci.   Most likely line sepsis.  Will remove catheter today.  Will continue to monitor for any renal recovery.  Limit free water intake to prevent hyponatremia.  Continue diuretics.    9/3 SY  --Permcath removed due to MRSA bacteremia.  Pt on HD since 5/2017 due to ATN /Sepsis.  Unclear if enough renal recovery.  Will follow trend for next 2-3 days to determine residual renal fx.  --Continue abtx for bacteremia.

## 2017-09-03 NOTE — PROGRESS NOTE ADULT - ASSESSMENT
.  1.  patient with difficult ventilating improved with trach manipulation  2. chronic right pleural effusion, patient was advised in past not to have intervention  3. now bacteremia 4 bottles of mrsa, permacath removed, repeat blood cultures drawn  4. oob to chair  4. possible pneumonia with MRSA, xanthomonas, d/w ID would d/c cefepime,consider bactrim  5. 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 will need f/u dialysis catheter at some point  7. Physical therapy

## 2017-09-03 NOTE — PROGRESS NOTE ADULT - SUBJECTIVE AND OBJECTIVE BOX
Yesterday had Perma cath removed    HPI:  This patient is a 67 year old male with PMH significant for:                     Hodgkings Disease treated with Radiation and RT                     CAD - s/p cath in 2009, cabg x 4                     chronic pleural effusion Right chest(s/p VATS 2016)                     aspiration Pneumonia 2016,                      ESRD on Dialysis since 2016                     Rash to vanco, or zoloft on steroid taper, Dress Syndrome                     chronic vent dependence                     hypothyroidism  At  nursing home developed increased HR with cyanosis sat ok  In ED there was high peak pressures, trach may have been positional but wbc has increased to 10  Blood cultures positive Staph Aureus, xanthomonas in sputume       PAST MEDICAL & SURGICAL HISTORY:  Hypothyroidism  ESRD on hemodialysis  Angina pectoris  Hodgkin's lymphoma  Tracheostomy dependent  Anemia  CHF (congestive heart failure)  H/O tracheostomy       MEDICATIONS  (STANDING):  metoprolol 12.5 milliGRAM(s) Oral two times a day  levothyroxine 150 MICROGram(s) Oral daily  heparin  Injectable 5000 Unit(s) SubCutaneous every 12 hours  predniSONE   Tablet 50 milliGRAM(s) Oral daily  DAPTOmycin IVPB   IV Intermittent   DAPTOmycin IVPB 460 milliGRAM(s) IV Intermittent every 48 hours  torsemide 40 milliGRAM(s) Oral daily  cefepime  IVPB 1000 milliGRAM(s) IV Intermittent daily  omeprazole Suspension 40 milliGRAM(s) Oral daily  potassium chloride   Powder 40 milliEquivalent(s) Oral every 12 hours    MEDICATIONS  (PRN):  ALPRAZolam 0.5 milliGRAM(s) Oral at bedtime PRN Sleep  oxyCODONE    5 mG/acetaminophen 325 mG 1 Tablet(s) Oral every 6 hours PRN Moderate Pain (4 - 6)      Allergies    epinephrine (Unknown)  fentanyl (Unknown)  Reglan (Unknown)  Vancomycin Hydrochloride (Rash)  Zoloft (Unknown)    Intolerances              Neuro: awake alert    CV: Vital Signs Last 24 Hrs  T(C): --  T(F): --  HR: 97 (03 Sep 2017 08:00) (83 - 109)  BP: 91/50 (03 Sep 2017 08:00) (81/47 - 107/66)  BP(mean): 60 (03 Sep 2017 08:00) (55 - 75)  RR: 21 (03 Sep 2017 08:00) (0 - 35)  SpO2: 100% (03 Sep 2017 06:00) (96% - 100%)          CARDIAC MARKERS ( 02 Sep 2017 06:38 )  x     / x     / 11 U/L / x     / x            Respiratory  Mode: AC/ CMV (Assist Control/ Continuous Mandatory Ventilation)  RR (machine): 14  TV (machine): 400  FiO2: 40  PEEP: 5  ITime: 1  PIP: 35  clear      CXR: right lower lobe complex effusion      Renal voiding  I&O's Summary    02 Sep 2017 07:01  -  03 Sep 2017 07:00  --------------------------------------------------------  IN: 840 mL / OUT: 1331 mL / NET: -491 mL      09-03    136  |  96  |  51<H>  ----------------------------<  118<H>  3.5   |  34<H>  |  2.20<H>    Ca    8.9      03 Sep 2017 05:56  Phos  1.7     09-03    TPro  x   /  Alb  2.1<L>  /  TBili  x   /  DBili  x   /  AST  x   /  ALT  x   /  AlkPhos  x   09-03      GI:      Nutrition    Heme:                           9.4    10.6  )-----------( 352      ( 03 Sep 2017 05:56 )             28.8          ID:          DVT Prophylaxis:        Advanced Directives:

## 2017-09-03 NOTE — PROVIDER CONTACT NOTE (CRITICAL VALUE NOTIFICATION) - SITUATION
blood culture postive for gram postive cocci in clusters
Growth in aerobic bottle, gram positive cocci in clusters and growth in anaerobic bottle, gram positive cocci in clusters

## 2017-09-04 LAB
ALBUMIN SERPL ELPH-MCNC: 2.2 G/DL — LOW (ref 3.3–5)
ANION GAP SERPL CALC-SCNC: 10 MMOL/L — SIGNIFICANT CHANGE UP (ref 5–17)
BUN SERPL-MCNC: 61 MG/DL — HIGH (ref 7–23)
CALCIUM SERPL-MCNC: 8.9 MG/DL — SIGNIFICANT CHANGE UP (ref 8.5–10.1)
CHLORIDE SERPL-SCNC: 95 MMOL/L — LOW (ref 96–108)
CO2 SERPL-SCNC: 31 MMOL/L — SIGNIFICANT CHANGE UP (ref 22–31)
CREAT SERPL-MCNC: 2.68 MG/DL — HIGH (ref 0.5–1.3)
GLUCOSE SERPL-MCNC: 103 MG/DL — HIGH (ref 70–99)
HCT VFR BLD CALC: 31 % — LOW (ref 39–50)
HGB BLD-MCNC: 10 G/DL — LOW (ref 13–17)
MCHC RBC-ENTMCNC: 32.3 GM/DL — SIGNIFICANT CHANGE UP (ref 32–36)
MCHC RBC-ENTMCNC: 33.4 PG — SIGNIFICANT CHANGE UP (ref 27–34)
MCV RBC AUTO: 103.4 FL — HIGH (ref 80–100)
PHOSPHATE SERPL-MCNC: 2 MG/DL — LOW (ref 2.5–4.5)
PLATELET # BLD AUTO: 375 K/UL — SIGNIFICANT CHANGE UP (ref 150–400)
POTASSIUM SERPL-MCNC: 4 MMOL/L — SIGNIFICANT CHANGE UP (ref 3.5–5.3)
POTASSIUM SERPL-SCNC: 4 MMOL/L — SIGNIFICANT CHANGE UP (ref 3.5–5.3)
RBC # BLD: 2.99 M/UL — LOW (ref 4.2–5.8)
RBC # FLD: 18.9 % — HIGH (ref 10.3–14.5)
SODIUM SERPL-SCNC: 136 MMOL/L — SIGNIFICANT CHANGE UP (ref 135–145)
WBC # BLD: 14.9 K/UL — HIGH (ref 3.8–10.5)
WBC # FLD AUTO: 14.9 K/UL — HIGH (ref 3.8–10.5)

## 2017-09-04 RX ADMIN — CEFTAZIDIME 107.2 GRAM(S): 6 INJECTION, POWDER, FOR SOLUTION INTRAVENOUS at 15:46

## 2017-09-04 RX ADMIN — Medication 50 MILLIGRAM(S): at 06:14

## 2017-09-04 RX ADMIN — OXYCODONE AND ACETAMINOPHEN 1 TABLET(S): 5; 325 TABLET ORAL at 22:57

## 2017-09-04 RX ADMIN — Medication 150 MICROGRAM(S): at 06:14

## 2017-09-04 RX ADMIN — OMEPRAZOLE 40 MILLIGRAM(S): 10 CAPSULE, DELAYED RELEASE ORAL at 17:28

## 2017-09-04 RX ADMIN — Medication 40 MILLIGRAM(S): at 06:14

## 2017-09-04 RX ADMIN — Medication 0.5 MILLIGRAM(S): at 22:57

## 2017-09-04 RX ADMIN — Medication 12.5 MILLIGRAM(S): at 18:02

## 2017-09-04 NOTE — PROGRESS NOTE ADULT - SUBJECTIVE AND OBJECTIVE BOX
NEPHROLOGY INTERVAL HPI/OVERNIGHT EVENTS:  9/4 SY  Alert.  On vent.    No acute events overnight.    9/3 SY  Permcath removed due to MRSA bacteremia.  No acute events.  Alert and responsive on vent/trach.    9/2 SY  S/p HD yesterday.  Now positive blood culture with G + in clusters.  Pt fully alert and responsive.  Understands the need to remove permcath.  Reeducated pt to limit po water intake.      HPI:  This patient is a 67 year old male with PMH significant for:                     Hodgkings Disease treated with Radiation and RT                     CAD - s/p cath in 2009, cabg x 4                     chronic pleural effusion Right chest(s/p VATS 2016)                     aspiration Pneumonia 2016,                      ESRD on Dialysis since 2016                     Rash to vanco, or zoloft on steroid taper                     chronic vent dependence                     hypothyroidism  Yesterday nursing home developed increased HR with cyanosis sat ok  In ED there was high peak pressures, trach may have been positional but wbc has increased to 30, was 14 at [facility]..  Admitted for evaluation, needs HD today.  Initially refused, then agreed to 3 hr hd       PAST MEDICAL & SURGICAL HISTORY:  Hypothyroidism  ESRD on hemodialysis  Angina pectoris  Hodgkin's lymphoma  Tracheostomy dependent  Anemia  CHF (congestive heart failure)  MEDICATIONS  (STANDING):  metoprolol 12.5 milliGRAM(s) Oral two times a day  levothyroxine 150 MICROGram(s) Oral daily  heparin  Injectable 5000 Unit(s) SubCutaneous every 12 hours  predniSONE   Tablet 50 milliGRAM(s) Oral daily  DAPTOmycin IVPB   IV Intermittent   DAPTOmycin IVPB 460 milliGRAM(s) IV Intermittent every 48 hours  torsemide 40 milliGRAM(s) Oral daily  omeprazole Suspension 40 milliGRAM(s) Oral daily  cefTAZidime  IVPB 1 Gram(s) IV Intermittent once    MEDICATIONS  (PRN):  ALPRAZolam 0.5 milliGRAM(s) Oral at bedtime PRN Sleep  oxyCODONE    5 mG/acetaminophen 325 mG 1 Tablet(s) Oral every 6 hours PRN Moderate Pain (4 - 6)          Vital Signs Last 24 Hrs  T(C): 36.7 (04 Sep 2017 07:38), Max: 37.2 (03 Sep 2017 12:24)  T(F): 98 (04 Sep 2017 07:38), Max: 99 (03 Sep 2017 12:24)  HR: 106 (04 Sep 2017 11:00) (79 - 109)  BP: 98/69 (04 Sep 2017 11:00) (74/46 - 109/77)  BP(mean): 75 (04 Sep 2017 11:00) (52 - 77)  RR: 24 (04 Sep 2017 11:00) (6 - 35)  SpO2: 100% (04 Sep 2017 05:27) (100% - 100%)  Daily     Daily     09-03 @ 07:01  -  09-04 @ 07:00  --------------------------------------------------------  IN: 1500 mL / OUT: 1825 mL / NET: -325 mL        PHYSICAL EXAM: Alert and approrpriate  GENERAL: on vent  CHEST/LUNG: Bilateral scattered rhonchi  HEART: S1S2 RRR  ABDOMEN: soft  EXTREMITIES: positive edema  SKIN:     LABS:                        10.0   14.9  )-----------( 375      ( 04 Sep 2017 08:00 )             31.0     09-04    136  |  95<L>  |  61<H>  ----------------------------<  103<H>  4.0   |  31  |  2.68<H>    Ca    8.9      04 Sep 2017 08:00  Phos  2.0     09-04    TPro  x   /  Alb  2.2<L>  /  TBili  x   /  DBili  x   /  AST  x   /  ALT  x   /  AlkPhos  x   09-04        Phosphorus Level, Serum: 2.0 mg/dL (09-04 @ 08:00)          RADIOLOGY & ADDITIONAL TESTS:

## 2017-09-04 NOTE — PHYSICAL THERAPY INITIAL EVALUATION ADULT - GENERAL OBSERVATIONS, REHAB EVAL
pt recd supine/semi-reclined, + trached/ +drain, agreeable to PT. RN present during Eval, pt is addressed as Dr. Denise

## 2017-09-04 NOTE — PROGRESS NOTE ADULT - SUBJECTIVE AND OBJECTIVE BOX
Events Overnight       comfortable  HPI:   This patient is a 67 year old male with PMH significant for:                     Hodgkings Disease treated with Radiation and RT                     CAD - s/p cath in 2009, cabg x 4                     chronic pleural effusion Right chest(s/p VATS 2016)                     aspiration Pneumonia 2016,                      ESRD on Dialysis since 2016                     Rash to vanco, or zoloft on steroid taper, Dress Syndrome                     chronic vent dependence                     hypothyroidism  At  nursing home developed increased HR with cyanosis sat ok  In ED there was high peak pressures, trach may have been positional but wbc has increased to 10  Blood cultures positive Staph Aureus, xanthomonas in sputume  on daptomycin, ceftazidime, repeat blood cultures better, dialysis catheter removed       PAST MEDICAL & SURGICAL HISTORY:  Hypothyroidism  ESRD on hemodialysis  Angina pectoris  Hodgkin's lymphoma  Tracheostomy dependent  Anemia  CHF (congestive heart failure)  H/O tracheostomy       MEDICATIONS  (STANDING):  metoprolol 12.5 milliGRAM(s) Oral two times a day  levothyroxine 150 MICROGram(s) Oral daily  heparin  Injectable 5000 Unit(s) SubCutaneous every 12 hours  predniSONE   Tablet 50 milliGRAM(s) Oral daily  DAPTOmycin IVPB   IV Intermittent   DAPTOmycin IVPB 460 milliGRAM(s) IV Intermittent every 48 hours  torsemide 40 milliGRAM(s) Oral daily  omeprazole Suspension 40 milliGRAM(s) Oral daily  cefTAZidime  IVPB 1 Gram(s) IV Intermittent once    MEDICATIONS  (PRN):  ALPRAZolam 0.5 milliGRAM(s) Oral at bedtime PRN Sleep  oxyCODONE    5 mG/acetaminophen 325 mG 1 Tablet(s) Oral every 6 hours PRN Moderate Pain (4 - 6)           epinephrine (Unknown)  fentanyl (Unknown)  Reglan (Unknown)  Vancomycin Hydrochloride (Rash)  Zoloft (Unknown)    Intolerances              Neuro: awake, alert, writing    CV: Vital Signs Last 24 Hrs  T(C): 36.7 (04 Sep 2017 07:38), Max: 37.2 (03 Sep 2017 12:24)  T(F): 98 (04 Sep 2017 07:38), Max: 99 (03 Sep 2017 12:24)  HR: 94 (04 Sep 2017 07:38) (79 - 110)  BP: 109/77 (04 Sep 2017 07:38) (74/46 - 109/77)  BP(mean): 75 (04 Sep 2017 06:00) (52 - 75)  RR: 15 (04 Sep 2017 07:38) (5 - 35)  SpO2: 100% (04 Sep 2017 05:27) (100% - 100%)                Respiratory  Mode: AC/ CMV (Assist Control/ Continuous Mandatory Ventilation)  RR (machine): 14  TV (machine): 400  FiO2: 40  PEEP: 5  ITime: 1  PIP: 50        CXR: right effusion      Renal  I&O's Summary    03 Sep 2017 07:01  -  04 Sep 2017 07:00  --------------------------------------------------------  IN: 1500 mL / OUT: 1825 mL / NET: -325 mL      09-04    136  |  95<L>  |  61<H>  ----------------------------<  103<H>  4.0   |  31  |  2.68<H>    Ca    8.9      04 Sep 2017 08:00  Phos  2.0     09-04    TPro  x   /  Alb  2.2<L>  /  TBili  x   /  DBili  x   /  AST  x   /  ALT  x   /  AlkPhos  x   09-04      GI: soft, non tender, g tube , jtube     Nutrition    Heme:                           10.0   14.9  )-----------( 375      ( 04 Sep 2017 08:00 )             31.0          ID: afebrile wbc 14, day 2 daptomycin, day 1 ceftazidime          DVT Prophylaxis:refused hedp subq        Advanced Directives: full code

## 2017-09-04 NOTE — PROGRESS NOTE ADULT - ASSESSMENT
1.  patient with difficult ventilating improved with trach manipulation  2. chronic right pleural effusion, patient was advised in past not to have intervention  3. now bacteremia 4 bottles of mrsa, permacath removed, repeat blood cultures  negative  4. oob to chair  4. possible pneumonia with MRSA, xanthomonas,  dapto, ceftaz  5. 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 will need f/u dialysis catheter at some point, monitoring renal function  7. Physical therapy

## 2017-09-04 NOTE — PROGRESS NOTE ADULT - ASSESSMENT
This patient is a 67 year old male with PMH significant for: Hodgkings Disease treated with Radiation, CAD - s/p cath in 2009, cabg x 4, chronic pleural effusion Right chest, aspiration Pneumonia 2016, ESRD on Dialysis since 2016, Rash to vanco, or zoloft on steroid taper, chronic vent dependence, hypothyroidism..  Yesterday nursing home developed increased HR with cyanosis sat ok  In ED there was high peak pressures, trach may have been positional but wbc has increased to 30, was 14 at [facility]..  Admitted for evaluation, needs HD today.  Initially refused, then agreed to 3 hr hd   Medical management as per ICU  EPO as per outpt orders    9/1 addendum 3:15 pm  seen on hd doing well    9/2 SY  --Pt with APRYL on HD since 5/2017.  Unclear if renal recovery can be expected.   Pt's pre HD creat was only 3.1.  Now found with positive blood culture with gram positive cocci.   Most likely line sepsis.  Will remove catheter today.  Will continue to monitor for any renal recovery.  Limit free water intake to prevent hyponatremia.  Continue diuretics.    9/3 SY  --Permcath removed due to MRSA bacteremia.  Pt on HD since 5/2017 due to ATN /Sepsis.  Unclear if enough renal recovery.  Will follow trend for next 2-3 days to determine residual renal fx.  --Continue abtx for bacteremia.    9/4 SY  --MRSA Bacteremia.  Continue ABTX.  --Creat continues to slowly rise.   Again unclear if pt has enough residula fx to remain off dialysis.  Continue to assess daily.  --If creat continues to rise, then will plan Permcath placement in 2-3 days.

## 2017-09-04 NOTE — PHYSICAL THERAPY INITIAL EVALUATION ADULT - PERTINENT HX OF CURRENT PROBLEM, REHAB EVAL
This patient is a 67 year old male with PMH significant for: Hodgkings Disease treated with Radiation, CAD - s/p cath in 2009, cabg x 4, chronic pleural effusion Right chest, aspiration Pneumonia 2016, ESRD on Dialysis since 2016,presenting with increased HR with cyanosis sat ok This patient is a 67 year old male with PMH significant for: Hodgkings Disease treated with Radiation, CAD - s/p cath in 2009, cabg x 4, chronic pleural effusion Right chest, aspiration Pneumonia 2016, ESRD on Dialysis since 2016,presenting with increased HR with cyanosis sat ok, increased WBC

## 2017-09-04 NOTE — ADVANCED PRACTICE NURSE CONSULT - ASSESSMENT
Rec'd. order to place midline catheter for IV access. Reviewed chart, labwork, explained all risks and benefits and pt. nonverbal w/chronic trach. but A&Ox3 and gave consent. Inserted Bard Powerglide Pro Midline catheter 20g 10 cm. length to left cephalic via ultrasound guidance with brisk blood return, flushes well w/20 ml. NS. Endcap placed. Pt. tolerated well. No CXR needed for midline. Report given to District Nurse. Lot #[id number].

## 2017-09-04 NOTE — PHYSICAL THERAPY INITIAL EVALUATION ADULT - ADDITIONAL COMMENTS
pt states was ambulatory with PT/rolling walker at [facility] (within constraints of vent).

## 2017-09-05 LAB
ALBUMIN SERPL ELPH-MCNC: 2 G/DL — LOW (ref 3.3–5)
ANION GAP SERPL CALC-SCNC: 9 MMOL/L — SIGNIFICANT CHANGE UP (ref 5–17)
BUN SERPL-MCNC: 69 MG/DL — HIGH (ref 7–23)
CALCIUM SERPL-MCNC: 8.8 MG/DL — SIGNIFICANT CHANGE UP (ref 8.5–10.1)
CHLORIDE SERPL-SCNC: 96 MMOL/L — SIGNIFICANT CHANGE UP (ref 96–108)
CO2 SERPL-SCNC: 33 MMOL/L — HIGH (ref 22–31)
CREAT SERPL-MCNC: 2.85 MG/DL — HIGH (ref 0.5–1.3)
GLUCOSE SERPL-MCNC: 110 MG/DL — HIGH (ref 70–99)
HCT VFR BLD CALC: 28.7 % — LOW (ref 39–50)
HGB BLD-MCNC: 9.2 G/DL — LOW (ref 13–17)
MCHC RBC-ENTMCNC: 32.2 GM/DL — SIGNIFICANT CHANGE UP (ref 32–36)
MCHC RBC-ENTMCNC: 33.6 PG — SIGNIFICANT CHANGE UP (ref 27–34)
MCV RBC AUTO: 104.5 FL — HIGH (ref 80–100)
PHOSPHATE SERPL-MCNC: 2.2 MG/DL — LOW (ref 2.5–4.5)
PLATELET # BLD AUTO: 348 K/UL — SIGNIFICANT CHANGE UP (ref 150–400)
POTASSIUM SERPL-MCNC: 4 MMOL/L — SIGNIFICANT CHANGE UP (ref 3.5–5.3)
POTASSIUM SERPL-SCNC: 4 MMOL/L — SIGNIFICANT CHANGE UP (ref 3.5–5.3)
RBC # BLD: 2.74 M/UL — LOW (ref 4.2–5.8)
RBC # FLD: 18.5 % — HIGH (ref 10.3–14.5)
SODIUM SERPL-SCNC: 138 MMOL/L — SIGNIFICANT CHANGE UP (ref 135–145)
WBC # BLD: 10.3 K/UL — SIGNIFICANT CHANGE UP (ref 3.8–10.5)
WBC # FLD AUTO: 10.3 K/UL — SIGNIFICANT CHANGE UP (ref 3.8–10.5)

## 2017-09-05 PROCEDURE — 71010: CPT | Mod: 26

## 2017-09-05 RX ORDER — CEFTAZIDIME 6 G/30ML
1 INJECTION, POWDER, FOR SOLUTION INTRAVENOUS EVERY 24 HOURS
Qty: 0 | Refills: 0 | Status: DISCONTINUED | OUTPATIENT
Start: 2017-09-05 | End: 2017-09-08

## 2017-09-05 RX ORDER — CHLORHEXIDINE GLUCONATE 213 G/1000ML
5 SOLUTION TOPICAL
Qty: 0 | Refills: 0 | Status: DISCONTINUED | OUTPATIENT
Start: 2017-09-05 | End: 2017-09-15

## 2017-09-05 RX ORDER — CEFTAZIDIME 6 G/30ML
1 INJECTION, POWDER, FOR SOLUTION INTRAVENOUS EVERY 24 HOURS
Qty: 0 | Refills: 0 | Status: DISCONTINUED | OUTPATIENT
Start: 2017-09-05 | End: 2017-09-05

## 2017-09-05 RX ADMIN — Medication 150 MICROGRAM(S): at 06:21

## 2017-09-05 RX ADMIN — Medication 12.5 MILLIGRAM(S): at 06:21

## 2017-09-05 RX ADMIN — Medication 40 MILLIGRAM(S): at 06:21

## 2017-09-05 RX ADMIN — Medication 12.5 MILLIGRAM(S): at 17:25

## 2017-09-05 RX ADMIN — CEFTAZIDIME 107.2 GRAM(S): 6 INJECTION, POWDER, FOR SOLUTION INTRAVENOUS at 15:18

## 2017-09-05 RX ADMIN — Medication 50 MILLIGRAM(S): at 06:21

## 2017-09-05 RX ADMIN — DAPTOMYCIN 118.4 MILLIGRAM(S): 500 INJECTION, POWDER, LYOPHILIZED, FOR SOLUTION INTRAVENOUS at 16:36

## 2017-09-05 RX ADMIN — OMEPRAZOLE 40 MILLIGRAM(S): 10 CAPSULE, DELAYED RELEASE ORAL at 12:30

## 2017-09-05 RX ADMIN — Medication 0.5 MILLIGRAM(S): at 23:24

## 2017-09-05 RX ADMIN — CHLORHEXIDINE GLUCONATE 5 MILLILITER(S): 213 SOLUTION TOPICAL at 17:25

## 2017-09-05 RX ADMIN — OXYCODONE AND ACETAMINOPHEN 1 TABLET(S): 5; 325 TABLET ORAL at 23:24

## 2017-09-05 NOTE — PROGRESS NOTE ADULT - SUBJECTIVE AND OBJECTIVE BOX
NEPHROLOGY INTERVAL HPI/OVERNIGHT EVENTS:    9/5  awake alert no new issues  wants to hold off HD, thinks has adequate renal function to go back to rehab at [place] near his house, off dialysis  d/w Dr Lamont Wheeler    9/4 SY  Alert.  On vent.    No acute events overnight.    9/3 SY  Permcath removed due to MRSA bacteremia.  No acute events.  Alert and responsive on vent/trach.    9/2 SY  S/p HD yesterday.  Now positive blood culture with G + in clusters.  Pt fully alert and responsive.  Understands the need to remove permcath.  Reeducated pt to limit po water intake.      HPI:  This patient is a 67 year old male with PMH significant for:                     Hodgkings Disease treated with Radiation and RT                     CAD - s/p cath in 2009, cabg x 4                     chronic pleural effusion Right chest(s/p VATS 2016)                     aspiration Pneumonia 2016,                      ESRD on Dialysis since 2016                     Rash to vanco, or zoloft on steroid taper                     chronic vent dependence                     hypothyroidism  Yesterday nursing home developed increased HR with cyanosis sat ok  In ED there was high peak pressures, trach may have been positional but wbc has increased to 30, was 14 at [facility]..  Admitted for evaluation, needs HD today.  Initially refused, then agreed to 3 hr hd       PAST MEDICAL & SURGICAL HISTORY:  Hypothyroidism  ESRD on hemodialysis  Angina pectoris  Hodgkin's lymphoma  Tracheostomy dependent  Anemia  CHF (congestive heart failure)      MEDICATIONS  (STANDING):  metoprolol 12.5 milliGRAM(s) Oral two times a day  levothyroxine 150 MICROGram(s) Oral daily  heparin  Injectable 5000 Unit(s) SubCutaneous every 12 hours  predniSONE   Tablet 50 milliGRAM(s) Oral daily  DAPTOmycin IVPB   IV Intermittent   DAPTOmycin IVPB 460 milliGRAM(s) IV Intermittent every 48 hours  torsemide 40 milliGRAM(s) Oral daily  omeprazole Suspension 40 milliGRAM(s) Oral daily  chlorhexidine 0.12% Liquid 5 milliLiter(s) Swish and Spit two times a day    MEDICATIONS  (PRN):  ALPRAZolam 0.5 milliGRAM(s) Oral at bedtime PRN Sleep  oxyCODONE    5 mG/acetaminophen 325 mG 1 Tablet(s) Oral every 6 hours PRN Moderate Pain (4 - 6)            Vital Signs Last 24 Hrs  T(C): 36.5 (04 Sep 2017 15:00), Max: 36.5 (04 Sep 2017 15:00)  T(F): 97.7 (04 Sep 2017 15:00), Max: 97.7 (04 Sep 2017 15:00)  HR: 106 (05 Sep 2017 10:00) (76 - 108)  BP: 106/68 (05 Sep 2017 10:00) (96/55 - 116/74)  BP(mean): 83 (05 Sep 2017 06:17) (65 - 83)  RR: 18 (05 Sep 2017 10:00) (8 - 28)  SpO2: 100% (05 Sep 2017 10:00) (100% - 100%)    I&O's Detail    04 Sep 2017 07:01  -  05 Sep 2017 07:00  --------------------------------------------------------  IN:    IV PiggyBack: 50 mL    Vital HN: 1450 mL  Total IN: 1500 mL    OUT:    PEG (Percutaneous Endoscopic Gastrostomy) Tube: 125 mL    Voided: 1050 mL  Total OUT: 1175 mL    Total NET: 325 mL      05 Sep 2017 07:01  -  05 Sep 2017 11:42  --------------------------------------------------------  IN:  Total IN: 0 mL    OUT:    PEG (Percutaneous Endoscopic Gastrostomy) Tube: 10 mL    Voided: 500 mL  Total OUT: 510 mL    Total NET: -510 mL            PHYSICAL EXAM: Alert and appropriate  GENERAL: on vent  CHEST/LUNG: Bilateral scattered rhonchi  HEART: S1S2 RRR  ABDOMEN: soft  EXTREMITIES: positive edema  SKIN:     LABS:                                   9.2    10.3  )-----------( 348      ( 05 Sep 2017 05:27 )             28.7     09-05    138  |  96  |  69<H>  ----------------------------<  110<H>  4.0   |  33<H>  |  2.85<H>    Ca    8.8      05 Sep 2017 05:27  Phos  2.2     09-05    TPro  x   /  Alb  2.0<L>  /  TBili  x   /  DBili  x   /  AST  x   /  ALT  x   /  AlkPhos  x   09-05          Phosphorus Level, Serum: 2.0 mg/dL (09-04 @ 08:00)          RADIOLOGY & ADDITIONAL TESTS:

## 2017-09-05 NOTE — PROGRESS NOTE ADULT - ASSESSMENT
66yo M suffering from MRSA sepsis and bacteremia - tunnelled HD cath thought to be source, though HCAP is other possible source.  acute on chronic respiratory failure due to difficulty ventilating - improved with tracheostomy tube manipulation  Chronic right pleural effusion, patient was advised in past not to have intervention  Tunnelled HD cath removed 9/2.  No acute need for HD today  remains chronically vent dependent  hemodynamics reasonable  DRESS syndrome   ? hx of diastolic chf ? but not on ACE as outpatient  EF 60%    Plan at this time is for continued care in CCU  HOB 30  GI and DVT prophylaxis as appropriate  monitor cultures  ABx  Mobilize, PT  TF's  continue steroid taper  will need new HD cath by IR or Vasc sx once HD indicated - today is day 3 without indwelling catheter.  supportive care 66yo M suffering from MRSA sepsis and bacteremia - tunnelled HD cath thought to be source, though HCAP is other possible source.  acute on chronic respiratory failure due to difficulty ventilating - improved with tracheostomy tube manipulation  Chronic right pleural effusion, patient was advised in past not to have intervention  Tunnelled HD cath removed 9/2.  No acute need for HD today  remains chronically vent dependent  hemodynamics reasonable  DRESS syndrome   ? hx of diastolic chf - pt reports RIGHT sided pericardial constriction as cause - Not on ACE or ARB due to poor renal function which required initiation of HD about 3 months ago and now borderline renal function which may or may not require continued HD.  EF 60%    Plan at this time is for continued care in CCU  HOB 30  GI and DVT prophylaxis as appropriate  monitor cultures  ABx  Mobilize, PT  TF's  continue steroid taper  patient may need new HD cath by IR or Vasc sx if HD again indicated - today is day 3 without indwelling catheter - making reasonable urine and no lyte or acid base issues this far - will monitor renal function and amend plan accordingly.  supportive care    Discussed with patient in detail and all questions answered - pt is optimistic that he will no longer need HD and then can be transfer to a facility closer to [facility] where his doctors are and where he is closer to his home.

## 2017-09-05 NOTE — PROGRESS NOTE ADULT - ASSESSMENT
This patient is a 67 year old male with PMH significant for: Hodgkings Disease treated with Radiation, CAD - s/p cath in 2009, cabg x 4, chronic pleural effusion Right chest, aspiration Pneumonia 2016, ESRD on Dialysis since 2016, Rash to vanco, or zoloft on steroid taper, chronic vent dependence, hypothyroidism..  Yesterday nursing home developed increased HR with cyanosis sat ok  In ED there was high peak pressures, trach may have been positional but wbc has increased to 30, was 14 at [facility]..  Admitted for evaluation, needs HD today.  Initially refused, then agreed to 3 hr hd   Medical management as per ICU  EPO as per outpt orders    9/1 addendum 3:15 pm  seen on hd doing well    9/2 SY  --Pt with APRYL on HD since 5/2017.  Unclear if renal recovery can be expected.   Pt's pre HD creat was only 3.1.  Now found with positive blood culture with gram positive cocci.   Most likely line sepsis.  Will remove catheter today.  Will continue to monitor for any renal recovery.  Limit free water intake to prevent hyponatremia.  Continue diuretics.    9/3 SY  --Permcath removed due to MRSA bacteremia.  Pt on HD since 5/2017 due to ATN /Sepsis.  Unclear if enough renal recovery.  Will follow trend for next 2-3 days to determine residual renal fx.  --Continue abtx for bacteremia.    9/4 SY  --MRSA Bacteremia.  Continue ABTX.  --Creat continues to slowly rise.   Again unclear if pt has enough residula fx to remain off dialysis.  Continue to assess daily.  --If creat continues to rise, then will plan Permcath placement in 2-3 days.     9/5  MRSA bacteremia, on Cubicin  - last hd friday, catheter removed due to MRSA, stable at this time  awake alert no new issues  wants to hold off HD, thinks has adequate renal function to go back to rehab at [facility] near his house, off dialysis  d/w Dr Lamont Wheeler, agrees to stay for now

## 2017-09-05 NOTE — PROGRESS NOTE ADULT - SUBJECTIVE AND OBJECTIVE BOX
67 year old male with PMH significant for:                     Hodgkings Disease treated with Radiation and RT                     CAD - s/p cath in 2009, cabg x 4                     chronic pleural effusion Right chest(s/p VATS 2016)                     aspiration Pneumonia 2016,                      ESRD on Dialysis since 2016                     Rash to vanco, or zoloft on steroid taper                     chronic vent dependence                     hypothyroidism  In nursing home pt developed increased HR with cyanosis - O2 Sat was ok  In ED pt with high peak pressures, trach may have been positional but WBC noted to be 30k whereas it was 14k at rwin.    Sputum and blood cultures (+) for MRSA  Pts tunnelled HD cath was removed when bacteremia identified as thought to be potential source  subsequent cultures have been NGTD.    ECHO EF 60% - No evidence of vegetation.    above d/w Dr Covarrubias who has been caring for the patient.    9/5: pt refusing most interventions and is agitated and uncooperative with nursing staff.        PAST MEDICAL & SURGICAL HISTORY:  Hypothyroidism  ESRD on hemodialysis  Angina pectoris  Hodgkin's lymphoma  Tracheostomy dependent  Anemia  CHF (congestive heart failure)  H/O tracheostomy    Allergies    epinephrine (Unknown)  fentanyl (Unknown)  Reglan (Unknown)  Vancomycin Hydrochloride (Rash)  Zoloft (Unknown)    ICU Vital Signs Last 24 Hrs  T(C): 36.5 (04 Sep 2017 15:00), Max: 36.5 (04 Sep 2017 15:00)  T(F): 97.7 (04 Sep 2017 15:00), Max: 97.7 (04 Sep 2017 15:00)  HR: 98 (05 Sep 2017 09:00) (76 - 108)  BP: 109/68 (05 Sep 2017 09:00) (96/55 - 116/74)  BP(mean): 83 (05 Sep 2017 06:17) (65 - 83)  ABP: --  ABP(mean): --  RR: 27 (05 Sep 2017 09:00) (8 - 28)  SpO2: 100% (05 Sep 2017 09:00) (100% - 100%)      Mode: AC/ CMV (Assist Control/ Continuous Mandatory Ventilation)  RR (machine): 14  TV (machine): 400  FiO2: 40  PEEP: 5  ITime: 1  PIP: 35      I&O's Summary    04 Sep 2017 07:01  -  05 Sep 2017 07:00  --------------------------------------------------------  IN: 1500 mL / OUT: 1175 mL / NET: 325 mL                              9.2    10.3  )-----------( 348      ( 05 Sep 2017 05:27 )             28.7       09-05    138  |  96  |  69<H>  ----------------------------<  110<H>  4.0   |  33<H>  |  2.85<H>    Ca    8.8      05 Sep 2017 05:27  Phos  2.2     09-05    TPro  x   /  Alb  2.0<L>  /  TBili  x   /  DBili  x   /  AST  x   /  ALT  x   /  AlkPhos  x   09-05    LIVER FUNCTIONS - ( 05 Sep 2017 05:27 )  Alb: 2.0 g/dL / Pro: x     / ALK PHOS: x     / ALT: x     / AST: x     / GGT: x             MEDICATIONS  (STANDING):  metoprolol 12.5 milliGRAM(s) Oral two times a day  levothyroxine 150 MICROGram(s) Oral daily  heparin  Injectable 5000 Unit(s) SubCutaneous every 12 hours  predniSONE   Tablet 50 milliGRAM(s) Oral daily  DAPTOmycin IVPB   IV Intermittent   DAPTOmycin IVPB 460 milliGRAM(s) IV Intermittent every 48 hours  torsemide 40 milliGRAM(s) Oral daily  omeprazole Suspension 40 milliGRAM(s) Oral daily    MEDICATIONS  (PRN):  ALPRAZolam 0.5 milliGRAM(s) Oral at bedtime PRN Sleep  oxyCODONE    5 mG/acetaminophen 325 mG 1 Tablet(s) Oral every 6 hours PRN Moderate Pain (4 - 6)          DVT Prophylaxis:    Advanced Directives:  Discussed with:    Visit Information:    ** Time is exclusive of billed procedures and/or teaching and/or routine family updates. 67 year old male with PMH significant for:                     Hodgkings Disease treated with Radiation and RT                     CAD - s/p cath in 2009, cabg x 4                     chronic pleural effusion Right chest(s/p VATS 2016)                     aspiration Pneumonia 2016,                      ESRD on Dialysis since 2016                     Rash to vanco, or zoloft on steroid taper                     chronic vent dependence                     hypothyroidism  In nursing home pt developed increased HR with cyanosis - O2 Sat was ok  In ED pt with high peak pressures, trach may have been positional but WBC noted to be 30k whereas it was 14k at rwin.    Sputum and blood cultures (+) for MRSA  Pts tunnelled HD cath was removed when bacteremia identified as thought to be potential source  subsequent cultures have been NGTD.    ECHO EF 60% - No evidence of vegetation.    above d/w Dr Covarrubias who has been caring for the patient.    9/5: this morning pt refusing most interventions and is agitated and uncooperative with nursing staff, currently he is calm and cooperative - issues and plans discussed in detail with him and all questions answered - he wants to get OOB and ambulate if possible.        PAST MEDICAL & SURGICAL HISTORY:  Hypothyroidism  ESRD on hemodialysis  Angina pectoris  Hodgkin's lymphoma  Tracheostomy dependent  Anemia  CHF (congestive heart failure)  H/O tracheostomy    Allergies    epinephrine (Unknown)  fentanyl (Unknown)  Reglan (Unknown)  Vancomycin Hydrochloride (Rash)  Zoloft (Unknown)    ICU Vital Signs Last 24 Hrs  T(C): 36.5 (04 Sep 2017 15:00), Max: 36.5 (04 Sep 2017 15:00)  T(F): 97.7 (04 Sep 2017 15:00), Max: 97.7 (04 Sep 2017 15:00)  HR: 98 (05 Sep 2017 09:00) (76 - 108)  BP: 109/68 (05 Sep 2017 09:00) (96/55 - 116/74)  BP(mean): 83 (05 Sep 2017 06:17) (65 - 83)  ABP: --  ABP(mean): --  RR: 27 (05 Sep 2017 09:00) (8 - 28)  SpO2: 100% (05 Sep 2017 09:00) (100% - 100%)      Mode: AC/ CMV (Assist Control/ Continuous Mandatory Ventilation)  RR (machine): 14  TV (machine): 400  FiO2: 40  PEEP: 5  ITime: 1  PIP: 35      I&O's Summary    04 Sep 2017 07:01  -  05 Sep 2017 07:00  --------------------------------------------------------  IN: 1500 mL / OUT: 1175 mL / NET: 325 mL                              9.2    10.3  )-----------( 348      ( 05 Sep 2017 05:27 )             28.7       09-05    138  |  96  |  69<H>  ----------------------------<  110<H>  4.0   |  33<H>  |  2.85<H>    Ca    8.8      05 Sep 2017 05:27  Phos  2.2     09-05    TPro  x   /  Alb  2.0<L>  /  TBili  x   /  DBili  x   /  AST  x   /  ALT  x   /  AlkPhos  x   09-05    LIVER FUNCTIONS - ( 05 Sep 2017 05:27 )  Alb: 2.0 g/dL / Pro: x     / ALK PHOS: x     / ALT: x     / AST: x     / GGT: x             MEDICATIONS  (STANDING):  metoprolol 12.5 milliGRAM(s) Oral two times a day  levothyroxine 150 MICROGram(s) Oral daily  heparin  Injectable 5000 Unit(s) SubCutaneous every 12 hours  predniSONE   Tablet 50 milliGRAM(s) Oral daily  DAPTOmycin IVPB   IV Intermittent   DAPTOmycin IVPB 460 milliGRAM(s) IV Intermittent every 48 hours  torsemide 40 milliGRAM(s) Oral daily  omeprazole Suspension 40 milliGRAM(s) Oral daily    MEDICATIONS  (PRN):  ALPRAZolam 0.5 milliGRAM(s) Oral at bedtime PRN Sleep  oxyCODONE    5 mG/acetaminophen 325 mG 1 Tablet(s) Oral every 6 hours PRN Moderate Pain (4 - 6)          DVT Prophylaxis: SQ heparin, venodynes    Advanced Directives: FULL  Discussed with: patient    Visit Information: SSQ    ** Time is exclusive of billed procedures and/or teaching and/or routine family updates.

## 2017-09-05 NOTE — PROGRESS NOTE ADULT - SUBJECTIVE AND OBJECTIVE BOX
This patient is a 67 year old male with PMH of Hodgkins disease s/p XRT/chemo, CAD s/p cath 2009, CABG X 4, chronic R pleural effusion s/p VATS 206, asp , ESRD on HD since May 2016, chronic resp failure s/p trach, vent dependent, hypothyroidism admitted 8/31 from NH where he developed tachycardia and cyanosis, was saturating normally, not hypoxic but was sent for further eval, here afebrile, wbc ct elevated to 30, CT chest showed moderate R loculated hydopneumothorax with pleural thickening/ L pleural effusion/pleural thickening, 4 bottles blood cx growing MRSA, + perm catheter in place for past 2 months, has vanco allergy, was given IV dapto/cefepime.                   no fevers  perm catheter removed  feels ok  no complaints    MEDICATIONS  (STANDING):  metoprolol 12.5 milliGRAM(s) Oral two times a day  levothyroxine 150 MICROGram(s) Oral daily  heparin  Injectable 5000 Unit(s) SubCutaneous every 12 hours  predniSONE   Tablet 50 milliGRAM(s) Oral daily  DAPTOmycin IVPB   IV Intermittent   DAPTOmycin IVPB 460 milliGRAM(s) IV Intermittent every 48 hours  torsemide 40 milliGRAM(s) Oral daily  omeprazole Suspension 40 milliGRAM(s) Oral daily  chlorhexidine 0.12% Liquid 5 milliLiter(s) Swish and Spit two times a day      Vital Signs Last 24 Hrs  T(C): 36.5 (04 Sep 2017 15:00), Max: 36.5 (04 Sep 2017 15:00)  T(F): 97.7 (04 Sep 2017 15:00), Max: 97.7 (04 Sep 2017 15:00)  HR: 98 (05 Sep 2017 12:00) (76 - 108)  BP: 106/68 (05 Sep 2017 10:00) (101/65 - 116/74)  BP(mean): 83 (05 Sep 2017 06:17) (71 - 83)  RR: 24 (05 Sep 2017 12:00) (8 - 28)  SpO2: 100% (05 Sep 2017 11:00) (100% - 100%)          PE:  Constitutional: frail looking, + trach   HEENT: NC/AT, EOMI, PERRLA  Neck: supple  Back: no tenderness  Respiratory: decreased breath sounds  Cardiovascular: S1S2 regular, no murmurs  Abdomen: soft, not tender, + gtube in place with some drainage  Genitourinary: deferred  Rectal: deferred  Musculoskeletal: no muscle tenderness, no joint swelling or tenderness  Extremities: no pedal edema  Neurological:  no focal deficits  Skin: no rashes    Labs:                        9.2    10.3  )-----------( 348      ( 05 Sep 2017 05:27 )             28.7     09-05    138  |  96  |  69<H>  ----------------------------<  110<H>  4.0   |  33<H>  |  2.85<H>    Ca    8.8      05 Sep 2017 05:27  Phos  2.2     09-05    TPro  x   /  Alb  2.0<L>  /  TBili  x   /  DBili  x   /  AST  x   /  ALT  x   /  AlkPhos  x   09-05           Cultures:         Culture - Blood in AM (09.03.17 @ 05:56)    Specimen Source: .Blood None    Culture Results:   No growth to date.    Culture - Blood in AM (09.03.17 @ 06:00)    Specimen Source: .Blood None    Culture Results:   No growth to date.      Culture - Blood (08.31.17 @ 17:29)    -  Methicillin resistant Staphylococcus aureus (MRSA): Detec    -  Candida parapsilosis: Nondet    -  Coagulase negative Staphylococcus: Nondet    -  Enterobacter cloacae complex: Nondet    -  Enterococcus species: Nondet    -  Escherichia coli: Nondet    -  Haemophilus influenzae: Nondet    -  Neisseria meningitidis: Nondet    -  Proteus species: Nondet    -  Pseudomonas aeruginosa: Nondet    -  Streptococcus agalactiae (Group B): Nondet    -  Acinetobacter baumanii: Nondet    -  Candida albicans: Nondet    -  Serratia marcescens: Nondet    -  Streptococcus pneumoniae: Nondet    -  Vancomycin resistant Enterococcus sp.: Nondet    Gram Stain:   Growth in aerobic bottle: Gram Positive Cocci in Clusters  Growth in anaerobic bottle: Gram Positive Cocci in Clusters    -  Candida glabrata: Nondet    -  Candida krusei: Nondet    -  Candida tropicalis: Nondet    -  Klebsiella oxytoca: Nondet    -  Klebsiella pneumoniae: Nondet    -  Listeria monocytogenes: Nondet    -  Multidrug (KPC pos) resistant organism: Nondet    -  Staphylococcus aureus: Nondet    -  Streptococcus pyogenes (Group A): Nondet    -  Streptococcus sp. (Not Grp A, B or S pneumoniae): Nondet    Specimen Source: .Blood Blood    Organism: Blood Culture PCR    Culture Results:   Growth in aerobic and anaerobic bottles: Staphylococcus aureus  ***Blood Panel PCR results on this specimen are available  approximately 3 hours after the Gram stain result.***  Gram stain, PCR, and/or culture results may not always  correspond dueto difference in methodologies.    Organism Identification: Blood Culture PCR    Method Type: PCR    Culture - Sputum . (09.01.17 @ 12:45)    -  Ampicillin/Sulbactam: R <=8/4    -  Ciprofloxacin: R >2    -  Erythromycin: R >4    -  Penicillin: R 8    Gram Stain:   No squamous epithelial cells per low power field  No polymorphonuclear cells seen per low power field  Few Gram Negative Rods per oil power field  Few Gram Positive Cocci in Clusters per oil power field    -  Gentamicin: S <=1    -  Levofloxacin: S 2    -  Tetra/Doxy: S <=1    -  Ceftazidime: S 4    -  Clindamycin: R >4    -  Oxacillin: R >2    -  RIF- Rifampin: S <=1    -  Trimethoprim/Sulfamethoxazole: S <=0.5/9.5    -  Trimethoprim/Sulfamethoxazole: S <=0.5/9.5    -  Cefazolin: R >16    -  Levofloxacin: R >4    -  Linezolid: S 2    -  Moxifloxacin(Aerobic): R >4    -  Vancomycin: S 2    Specimen Source: .Sputum Sputum    Culture Results:   Moderate Methicillin resistant Staphylococcus aureus  Numerous Stenotrophomonas maltophilia  Normal Respiratory Ekaterina absent    Organism Identification: Methicillin resistant Staphylococcus aureus  Stenotrophomonas maltophilia    Organism: Methicillin resistant Staphylococcus aureus    Organism: Stenotrophomonas maltophilia    Method Type: AWILDA    Method Type: AWILDA            Radiology:  TTE no obvious vegetations     < from: CT Chest No Cont (08.31.17 @ 17:58) >  EXAM:  CT CHEST                            PROCEDURE DATE:  08/31/2017          INTERPRETATION:  CT CHEST    HISTORY:  respiratory failure                     TECHNIQUE: Noncontrast CT of the chest was performed. Coronal and   sagittal images were reconstructed. This study was performed using   automatic exposure control (radiation dose reduction software) to obtain   a diagnostic image quality scan with patient dose as low as reasonably   achievable.    COMPARISON: Chest x-ray from the same day    FINDINGS:    LUNGS, AIRWAYS: Tracheostomy in place. The central airways are patent.   Mild interstitial and alveolar pulmonary edema. Bibasilar compressive   atelectasis with near complete collapse of both lower lobes.    PLEURA: Moderate loculated right hydropneumothorax pleural thickening.   Moderate layering left pleural effusion.    HEART AND VESSELS: Status post CABG. Right IJ dialysis catheter tip in   the right atrium. Normal heart size. No pericardial effusion. Extensive   aortic valve and coronary calcification. Atherosclerotic thoracic aorta   is normal in caliber.    MEDIASTINUM AND VIKI: No adenopathy.    UPPER ABDOMEN: Small ascites. Gallstones. Percutaneous gastrostomy tube   in place. Splenectomy.    BONES AND SOFT TISSUES:Status post sternotomy. No acute bony abnormality.    IMPRESSION:     Mild pulmonary interstitial and alveolar edema.    Near-complete collapse of both lower lobes secondary to compression   atelectasis.    Moderate loculated right hydropneumothorax with associated pleural   thickening. Moderate layering left pleural effusion with associated   pleural thickening.      < end of copied text >      Advanced directives addressed: full resuscitation

## 2017-09-05 NOTE — PROGRESS NOTE ADULT - ASSESSMENT
This patient is a 67 year old male with PMH of Hodgkins disease s/p XRT/chemo, CAD s/p cath 2009, CABG X 4, chronic R pleural effusion s/p VATS 206, asp , ESRD on HD since May 2016, chronic resp failure s/p trach, vent dependent, hypothyroidism admitted 8/31 from NH where he developed tachycardia and cyanosis, was saturating normally, not hypoxic but was sent for further eval, here afebrile, wbc ct elevated to 30, CT chest showed moderate R loculated hydropneumothorax with pleural thickening/ L pleural effusion/pleural thickening, 4 bottles blood cx growing MRSA, + perm catheter in place for past 2 months, has vanco allergy, was given IV dapto/cefepime.     1. MRSA sepsis/probable tessio-related infection/chronic resp failure s/p trach/R loculated hydropneumothorax/L pleural effusion/ESRD/immunocompromised hos  - slowly improving, no fevers, wbc ct normalized, perm-catheter removed  - blood cx with MRSA 4 bottles on 8/31  - repeat blood cx 9/3 no growth  - on IV daptomycin  460mg q48h, post dialysis on HD days, cpk weekly check (baseline normal) #5  - will require 4 weeks of daptomycin 460mg q48h until 9/30/17 with weekly cbc, cmp, esr, crp and cpk  - sputum cx growing stenotrophamonas/MRSA  - has R hydropneumothorax/loculated effusion but likely chronic than acute, hx of prior VATS, sputum cx likely d/t colonization  - completed 3 days of cefepime, switched to ceftazidime 1 gm q24h for steno coverage #2  - can switch upon d/c to levaquin per peg tube 250mg daily for total 7 days  - if any worsening in resp status, recommend thoracentesis/drainage of effusion  - dialysis on hold for now  - f/u cbc  - trach care  - -tolerating abx well so far; no side effects noted  -reason for abx use and side effects reviewed with patient  - supportive care

## 2017-09-06 LAB
ALBUMIN SERPL ELPH-MCNC: 2.1 G/DL — LOW (ref 3.3–5)
ANION GAP SERPL CALC-SCNC: 9 MMOL/L — SIGNIFICANT CHANGE UP (ref 5–17)
BUN SERPL-MCNC: 75 MG/DL — HIGH (ref 7–23)
CALCIUM SERPL-MCNC: 9 MG/DL — SIGNIFICANT CHANGE UP (ref 8.5–10.1)
CHLORIDE SERPL-SCNC: 96 MMOL/L — SIGNIFICANT CHANGE UP (ref 96–108)
CO2 SERPL-SCNC: 33 MMOL/L — HIGH (ref 22–31)
CREAT SERPL-MCNC: 3.18 MG/DL — HIGH (ref 0.5–1.3)
GLUCOSE SERPL-MCNC: 70 MG/DL — SIGNIFICANT CHANGE UP (ref 70–99)
HCT VFR BLD CALC: 28.9 % — LOW (ref 39–50)
HGB BLD-MCNC: 9.4 G/DL — LOW (ref 13–17)
MAGNESIUM SERPL-MCNC: 2.1 MG/DL — SIGNIFICANT CHANGE UP (ref 1.6–2.6)
MCHC RBC-ENTMCNC: 32.5 GM/DL — SIGNIFICANT CHANGE UP (ref 32–36)
MCHC RBC-ENTMCNC: 33.1 PG — SIGNIFICANT CHANGE UP (ref 27–34)
MCV RBC AUTO: 101.8 FL — HIGH (ref 80–100)
PHOSPHATE SERPL-MCNC: 2.4 MG/DL — LOW (ref 2.5–4.5)
PLATELET # BLD AUTO: 382 K/UL — SIGNIFICANT CHANGE UP (ref 150–400)
POTASSIUM SERPL-MCNC: 4.1 MMOL/L — SIGNIFICANT CHANGE UP (ref 3.5–5.3)
POTASSIUM SERPL-SCNC: 4.1 MMOL/L — SIGNIFICANT CHANGE UP (ref 3.5–5.3)
RBC # BLD: 2.84 M/UL — LOW (ref 4.2–5.8)
RBC # FLD: 18.6 % — HIGH (ref 10.3–14.5)
SODIUM SERPL-SCNC: 138 MMOL/L — SIGNIFICANT CHANGE UP (ref 135–145)
WBC # BLD: 10.8 K/UL — HIGH (ref 3.8–10.5)
WBC # FLD AUTO: 10.8 K/UL — HIGH (ref 3.8–10.5)

## 2017-09-06 RX ADMIN — Medication 12.5 MILLIGRAM(S): at 18:35

## 2017-09-06 RX ADMIN — Medication 0.5 MILLIGRAM(S): at 23:18

## 2017-09-06 RX ADMIN — Medication 50 MILLIGRAM(S): at 06:13

## 2017-09-06 RX ADMIN — OXYCODONE AND ACETAMINOPHEN 1 TABLET(S): 5; 325 TABLET ORAL at 23:18

## 2017-09-06 RX ADMIN — CHLORHEXIDINE GLUCONATE 5 MILLILITER(S): 213 SOLUTION TOPICAL at 06:12

## 2017-09-06 RX ADMIN — Medication 40 MILLIGRAM(S): at 06:13

## 2017-09-06 RX ADMIN — CHLORHEXIDINE GLUCONATE 5 MILLILITER(S): 213 SOLUTION TOPICAL at 18:34

## 2017-09-06 RX ADMIN — OXYCODONE AND ACETAMINOPHEN 1 TABLET(S): 5; 325 TABLET ORAL at 23:30

## 2017-09-06 RX ADMIN — Medication 12.5 MILLIGRAM(S): at 06:13

## 2017-09-06 RX ADMIN — Medication 150 MICROGRAM(S): at 06:13

## 2017-09-06 RX ADMIN — CEFTAZIDIME 107.2 GRAM(S): 6 INJECTION, POWDER, FOR SOLUTION INTRAVENOUS at 16:04

## 2017-09-06 RX ADMIN — OMEPRAZOLE 40 MILLIGRAM(S): 10 CAPSULE, DELAYED RELEASE ORAL at 16:18

## 2017-09-06 NOTE — PROGRESS NOTE ADULT - ASSESSMENT
This patient is a 67 year old male with PMH significant for: Hodgkings Disease treated with Radiation, CAD - s/p cath in 2009, cabg x 4, chronic pleural effusion Right chest, aspiration Pneumonia 2016, ESRD on Dialysis since 2016, Rash to vanco, or zoloft on steroid taper, chronic vent dependence, hypothyroidism..  Yesterday nursing home developed increased HR with cyanosis sat ok  In ED there was high peak pressures, trach may have been positional but wbc has increased to 30, was [facility] at [facility]..  Admitted for evaluation, needs HD today.  Initially refused, then agreed to 3 hr hd   Medical management as per ICU  EPO as per outpt orders    9/1 addendum 3:15 pm  seen on hd doing well    9/2 SY  --Pt with APRYL on HD since 5/2017.  Unclear if renal recovery can be expected.   Pt's pre HD creat was only 3.1.  Now found with positive blood culture with gram positive cocci.   Most likely line sepsis.  Will remove catheter today.  Will continue to monitor for any renal recovery.  Limit free water intake to prevent hyponatremia.  Continue diuretics.    9/3 SY  --Permcath removed due to MRSA bacteremia.  Pt on HD since 5/2017 due to ATN /Sepsis.  Unclear if enough renal recovery.  Will follow trend for next 2-3 days to determine residual renal fx.  --Continue abtx for bacteremia.    9/4 SY  --MRSA Bacteremia.  Continue ABTX.  --Creat continues to slowly rise.   Again unclear if pt has enough residula fx to remain off dialysis.  Continue to assess daily.  --If creat continues to rise, then will plan Permcath placement in 2-3 days.     9/5  MRSA bacteremia, on Cubicin  - last hd friday, catheter removed due to MRSA, stable at this time  awake alert no new issues  wants to hold off HD, thinks has adequate renal function to go back to rehab at [place] near his house, off dialysis  d/w Dr Lamont Wheeler, agrees to stay for now    9/6 SY  --APRYL on HD, last TX on 9/1  Renal parameters continue to slowly increase,  therefore, even with adequate urine volume, pt most anjelicaley will need to restart HD   Will monitor for another 1-2 days.  Pt now agreeable with plan for possible permcath placement on 9/8. Friday.  --MRSA bacteremia --continue abtx.

## 2017-09-06 NOTE — PROGRESS NOTE ADULT - ASSESSMENT
1. MRSA sepsis/probable tessio-related infection/chronic resp failure s/p trach/R loculated hydropneumothorax/L pleural effusion/ESRD/immunocompromised hos  - slowly improving, no fevers, wbc ct normalized, perm-catheter removed  - blood cx with MRSA 4 bottles on 8/31,f/u blood cutlures negative, con Dapto, ceftaz     has R hydropneumothorax/loculated effusion but likely chronic than acute, hx of prior VATS, sputum cx likely d/t colonization   - dialysis on hold for now but creatinine continues to increase, will need perma cath at some point   PT   weaning trial

## 2017-09-06 NOTE — PROGRESS NOTE ADULT - SUBJECTIVE AND OBJECTIVE BOX
Events Overnight: trach changed yesterday, aeration better    HPI:     · Subjective and Objective: 	  67 year old male with PMH significant for:                     Hodgkings Disease treated with Radiation and RT                     CAD - s/p cath in 2009, cabg x 4                     chronic pleural effusion Right chest(s/p VATS 2016)                     aspiration Pneumonia 2016,                      ESRD on Dialysis since 2016                     Rash to vanco, or zoloft on steroid taper                     chronic vent dependence                     hypothyroidism  In nursing home pt developed increased HR with cyanosis - O2 Sat was ok  In ED pt with high peak pressures, trach may have been positional but [facility] noted to be 30k [facility]    Sputum and blood cultures (+) for MRSA, f/u cultures negative  Pts tunnelled HD cath was removed when bacteremia identified as thought to be potential source  subsequent cultures have been NGTD.    ECHO EF 60% - No evidence of vegetation.  creatine 3.1 drifting up     ICAL & SURGICAL HISTORY:  Hypothyroidism  ESRD on hemodialysis  Angina pectoris  Hodgkin's lymphoma  Tracheostomy dependent  Anemia  CHF (congestive heart failure)  H/O tracheostomy       MEDICATIONS  (STANDING):  metoprolol 12.5 milliGRAM(s) Oral two times a day  levothyroxine 150 MICROGram(s) Oral daily  heparin  Injectable 5000 Unit(s) SubCutaneous every 12 hours  predniSONE   Tablet 50 milliGRAM(s) Oral daily  DAPTOmycin IVPB   IV Intermittent   DAPTOmycin IVPB 460 milliGRAM(s) IV Intermittent every 48 hours  torsemide 40 milliGRAM(s) Oral daily  omeprazole Suspension 40 milliGRAM(s) Oral daily  chlorhexidine 0.12% Liquid 5 milliLiter(s) Swish and Spit two times a day  cefTAZidime  IVPB 1 Gram(s) IV Intermittent every 24 hours    MEDICATIONS  (PRN):  ALPRAZolam 0.5 milliGRAM(s) Oral at bedtime PRN Sleep  oxyCODONE    5 mG/acetaminophen 325 mG 1 Tablet(s) Oral every 6 hours PRN Moderate Pain (4 - 6)        epinephrine (Unknown)  fentanyl (Unknown)  Reglan (Unknown)  Vancomycin Hydrochloride (Rash)  Zoloft (Unknown)         Neuro:       awake, alert  CV: Vital Signs Last 24 Hrs  T(C): 36.7 (06 Sep 2017 07:57), Max: 36.8 (06 Sep 2017 05:00)  T(F): 98 (06 Sep 2017 07:57), Max: 98.2 (06 Sep 2017 05:00)  HR: 86 (06 Sep 2017 06:17) (86 - 108)  BP: 105/57 (06 Sep 2017 06:17) (81/58 - 109/68)  BP(mean): 55 (05 Sep 2017 23:15) (55 - 63)  RR: 23 (06 Sep 2017 06:17) (11 - 30)  SpO2: 96% (05 Sep 2017 23:16) (96% - 100%)              Respiratory  Mode: AC/ CMV (Assist Control/ Continuous Mandatory Ventilation)  RR (machine): 14  TV (machine): 400  FiO2: 40  PEEP: 5  ITime: 1  PIP: 30      CXR: right effusion      Renal  I&O's Summary    05 Sep 2017 07:01  -  06 Sep 2017 07:00  --------------------------------------------------------  IN: 1550 mL / OUT: 2060 mL / NET: -510 mL      09-06    138  |  96  |  75<H>  ----------------------------<  70  4.1   |  33<H>  |  3.18<H>    Ca    9.0      06 Sep 2017 06:10  Phos  2.4     09-06  Mg     2.1     09-06    TPro  x   /  Alb  2.1<L>  /  TBili  x   /  DBili  x   /  AST  x   /  ALT  x   /  AlkPhos  x   09-06      GI:      Nutrition    Heme:                           9.4    10.8  )-----------( 382      ( 06 Sep 2017 06:10 )             28.9          ID: blood [id number] MRSA - on dapto                                              ceftaz for stenotropomonas in sputum          DVT Prophylaxis: dvt subq        Advanced Directives:

## 2017-09-06 NOTE — PROGRESS NOTE ADULT - SUBJECTIVE AND OBJECTIVE BOX
NEPHROLOGY INTERVAL HPI/OVERNIGHT EVENTS:   SY  Alert.  No acute events overnight.      awake alert no new issues  wants to hold off HD, thinks has adequate renal function to go back to rehab at [place] near his house, off dialysis  d/w Dr Lamont Wheeler     SY  Alert.  On vent.    No acute events overnight.    9/3 SY  Permcath removed due to MRSA bacteremia.  No acute events.  Alert and responsive on vent/trach.     SY  S/p HD yesterday.  Now positive blood culture with G + in clusters.  Pt fully alert and responsive.  Understands the need to remove permcath.  Reeducated pt to limit po water intake.      HPI:  This patient is a 67 year old male with PMH significant for:                     Hodgkings Disease treated with Radiation and RT                     CAD - s/p cath in , cabg x 4                     chronic pleural effusion Right chest(s/p VATS )                     aspiration Pneumonia ,                      ESRD on Dialysis since                      Rash to vanco, or zoloft on steroid taper                     chronic vent dependence                     hypothyroidism  Yesterday nursing home developed increased HR with cyanosis sat ok  In ED there was high peak pressures, trach may have been positional but wbc has increased to 30, was [facility] at [facility]..  Admitted for evaluation, needs HD today.  Initially refused, then agreed to 3 hr hd       PAST MEDICAL & SURGICAL HISTORY:  Hypothyroidism  ESRD on hemodialysis  Angina pectoris  Hodgkin's lymphoma  Tracheostomy dependent  Anemia    MEDICATIONS  (STANDING):  metoprolol 12.5 milliGRAM(s) Oral two times a day  levothyroxine 150 MICROGram(s) Oral daily  heparin  Injectable 5000 Unit(s) SubCutaneous every 12 hours  predniSONE   Tablet 50 milliGRAM(s) Oral daily  DAPTOmycin IVPB   IV Intermittent   DAPTOmycin IVPB 460 milliGRAM(s) IV Intermittent every 48 hours  torsemide 40 milliGRAM(s) Oral daily  omeprazole Suspension 40 milliGRAM(s) Oral daily  chlorhexidine 0.12% Liquid 5 milliLiter(s) Swish and Spit two times a day  cefTAZidime  IVPB 1 Gram(s) IV Intermittent every 24 hours    MEDICATIONS  (PRN):  ALPRAZolam 0.5 milliGRAM(s) Oral at bedtime PRN Sleep  oxyCODONE    5 mG/acetaminophen 325 mG 1 Tablet(s) Oral every 6 hours PRN Moderate Pain (4 - 6)          Vital Signs Last 24 Hrs  T(C): 36.7 (06 Sep 2017 07:57), Max: 36.8 (06 Sep 2017 05:00)  T(F): 98 (06 Sep 2017 07:57), Max: 98.2 (06 Sep 2017 05:00)  HR: 86 (06 Sep 2017 06:17) (86 - 108)  BP: 105/57 (06 Sep 2017 06:17) (81/58 - 106/60)  BP(mean): 55 (05 Sep 2017 23:15) (55 - 63)  RR: 23 (06 Sep 2017 06:17) (11 - 30)  SpO2: 96% (05 Sep 2017 23:16) (96% - 100%)  Daily     Daily Weight in k (06 Sep 2017 04:00)     @ 07:01  -   @ 07:00  --------------------------------------------------------  IN: 1550 mL / OUT: 2060 mL / NET: -510 mL        PHYSICAL EXAM:  Alert and appropriate  GENERAL: No apparent distress  CHEST/LUNG: Bilateral rhonchi unchanged  HEART: S1S2 RRR  ABDOMEN: soft  EXTREMITIES: trace edema  SKIN:     LABS:                        9.4    10.8  )-----------( 382      ( 06 Sep 2017 06:10 )             28.9         138  |  96  |  75<H>  ----------------------------<  70  4.1   |  33<H>  |  3.18<H>    Ca    9.0      06 Sep 2017 06:10  Phos  2.4       Mg     2.1         TPro  x   /  Alb  2.1<L>  /  TBili  x   /  DBili  x   /  AST  x   /  ALT  x   /  AlkPhos  x           Magnesium, Serum: 2.1 mg/dL ( @ 06:10)  Phosphorus Level, Serum: 2.4 mg/dL ( @ 06:10)          RADIOLOGY & ADDITIONAL TESTS:

## 2017-09-07 LAB
ALBUMIN SERPL ELPH-MCNC: 2.2 G/DL — LOW (ref 3.3–5)
ANION GAP SERPL CALC-SCNC: 10 MMOL/L — SIGNIFICANT CHANGE UP (ref 5–17)
APTT BLD: 28.2 SEC — SIGNIFICANT CHANGE UP (ref 27.5–37.4)
BUN SERPL-MCNC: 83 MG/DL — HIGH (ref 7–23)
CALCIUM SERPL-MCNC: 8.9 MG/DL — SIGNIFICANT CHANGE UP (ref 8.5–10.1)
CHLORIDE SERPL-SCNC: 95 MMOL/L — LOW (ref 96–108)
CO2 SERPL-SCNC: 33 MMOL/L — HIGH (ref 22–31)
CREAT SERPL-MCNC: 3.3 MG/DL — HIGH (ref 0.5–1.3)
CULTURE RESULTS: SIGNIFICANT CHANGE UP
GLUCOSE SERPL-MCNC: 119 MG/DL — HIGH (ref 70–99)
HCT VFR BLD CALC: 29.3 % — LOW (ref 39–50)
HGB BLD-MCNC: 9.5 G/DL — LOW (ref 13–17)
INR BLD: 1.22 RATIO — HIGH (ref 0.88–1.16)
MCHC RBC-ENTMCNC: 32.4 GM/DL — SIGNIFICANT CHANGE UP (ref 32–36)
MCHC RBC-ENTMCNC: 33.1 PG — SIGNIFICANT CHANGE UP (ref 27–34)
MCV RBC AUTO: 102.2 FL — HIGH (ref 80–100)
PHOSPHATE SERPL-MCNC: 3.1 MG/DL — SIGNIFICANT CHANGE UP (ref 2.5–4.5)
PLATELET # BLD AUTO: 406 K/UL — HIGH (ref 150–400)
POTASSIUM SERPL-MCNC: 4.4 MMOL/L — SIGNIFICANT CHANGE UP (ref 3.5–5.3)
POTASSIUM SERPL-SCNC: 4.4 MMOL/L — SIGNIFICANT CHANGE UP (ref 3.5–5.3)
PROTHROM AB SERPL-ACNC: 13.2 SEC — HIGH (ref 9.8–12.7)
RBC # BLD: 2.86 M/UL — LOW (ref 4.2–5.8)
RBC # FLD: 18.6 % — HIGH (ref 10.3–14.5)
SODIUM SERPL-SCNC: 138 MMOL/L — SIGNIFICANT CHANGE UP (ref 135–145)
SPECIMEN SOURCE: SIGNIFICANT CHANGE UP
WBC # BLD: 10.7 K/UL — HIGH (ref 3.8–10.5)
WBC # FLD AUTO: 10.7 K/UL — HIGH (ref 3.8–10.5)

## 2017-09-07 RX ORDER — DIPHENHYDRAMINE HCL 50 MG
25 CAPSULE ORAL EVERY 6 HOURS
Qty: 0 | Refills: 0 | Status: DISCONTINUED | OUTPATIENT
Start: 2017-09-07 | End: 2017-09-15

## 2017-09-07 RX ADMIN — Medication 50 MILLIGRAM(S): at 06:08

## 2017-09-07 RX ADMIN — Medication 40 MILLIGRAM(S): at 06:08

## 2017-09-07 RX ADMIN — OMEPRAZOLE 40 MILLIGRAM(S): 10 CAPSULE, DELAYED RELEASE ORAL at 13:13

## 2017-09-07 RX ADMIN — CHLORHEXIDINE GLUCONATE 5 MILLILITER(S): 213 SOLUTION TOPICAL at 17:53

## 2017-09-07 RX ADMIN — Medication 12.5 MILLIGRAM(S): at 17:53

## 2017-09-07 RX ADMIN — DAPTOMYCIN 118.4 MILLIGRAM(S): 500 INJECTION, POWDER, LYOPHILIZED, FOR SOLUTION INTRAVENOUS at 17:49

## 2017-09-07 RX ADMIN — Medication 12.5 MILLIGRAM(S): at 06:07

## 2017-09-07 RX ADMIN — CEFTAZIDIME 107.2 GRAM(S): 6 INJECTION, POWDER, FOR SOLUTION INTRAVENOUS at 13:12

## 2017-09-07 RX ADMIN — OXYCODONE AND ACETAMINOPHEN 1 TABLET(S): 5; 325 TABLET ORAL at 23:28

## 2017-09-07 RX ADMIN — Medication 0.5 MILLIGRAM(S): at 23:28

## 2017-09-07 RX ADMIN — CHLORHEXIDINE GLUCONATE 5 MILLILITER(S): 213 SOLUTION TOPICAL at 06:07

## 2017-09-07 RX ADMIN — Medication 150 MICROGRAM(S): at 06:08

## 2017-09-07 NOTE — SWALLOW BEDSIDE ASSESSMENT ADULT - ORAL PHASE
Bolus formation/transfer were mechanically functional without an overt oral motor focality. No dribbling or oral pooling were evident.

## 2017-09-07 NOTE — SWALLOW BEDSIDE ASSESSMENT ADULT - SWALLOW EVAL: RECOMMENDED DIET
Suggest a Clear Liquid Diet with Thin Liquids as tolerated, He appears clinically tolerant of these food textures from an oropharyngeal swallowing stance. Expect PO intake to be reduced as clear liquids primarily serving quality of life reasons. His primary source of nutrition/hydration will remain non orally via PEG. Suggest a Clear Liquid Diet with Thin Liquids as tolerated, He appears clinically tolerant of these food textures from an oropharyngeal swallowing stance. Expect PO intake to be reduced as clear liquids are primarily serving quality of life purposes. His primary source of nutrition/hydration will remain non orally via PEG.

## 2017-09-07 NOTE — SWALLOW BEDSIDE ASSESSMENT ADULT - NS SPL SWALLOW CLINIC TRIAL FT
Foods more coarse then liquid were not offered given GI history and these types of textures were not in food inventory PTH, Odynophagia was denied.

## 2017-09-07 NOTE — PROGRESS NOTE ADULT - SUBJECTIVE AND OBJECTIVE BOX
This patient is a 67 year old male with PMH of Hodgkins disease s/p XRT/chemo, CAD s/p cath 2009, CABG X 4, chronic R pleural effusion s/p VATS 206, asp , ESRD on HD since May 2016, chronic resp failure s/p trach, vent dependent, hypothyroidism admitted 8/31 from NH where he developed tachycardia and cyanosis, was saturating normally, not hypoxic but was sent for further eval, here afebrile, wbc ct elevated to 30, CT chest showed moderate R loculated hydopneumothorax with pleural thickening/ L pleural effusion/pleural thickening, 4 bottles blood cx growing MRSA, + perm catheter in place for past 2 months, has vanco allergy, was given IV dapto/cefepime.                   no fevers  perm catheter removed 9/2  feels ok  no complaints      MEDICATIONS  (STANDING):  metoprolol 12.5 milliGRAM(s) Oral two times a day  levothyroxine 150 MICROGram(s) Oral daily  heparin  Injectable 5000 Unit(s) SubCutaneous every 12 hours  predniSONE   Tablet 50 milliGRAM(s) Oral daily  DAPTOmycin IVPB   IV Intermittent   DAPTOmycin IVPB 460 milliGRAM(s) IV Intermittent every 48 hours  torsemide 40 milliGRAM(s) Oral daily  omeprazole Suspension 40 milliGRAM(s) Oral daily  chlorhexidine 0.12% Liquid 5 milliLiter(s) Swish and Spit two times a day  cefTAZidime  IVPB 1 Gram(s) IV Intermittent every 24 hours      Vital Signs Last 24 Hrs  T(C): 36.6 (07 Sep 2017 05:00), Max: 36.6 (07 Sep 2017 00:00)  T(F): 97.9 (07 Sep 2017 05:00), Max: 97.9 (07 Sep 2017 05:00)  HR: 85 (07 Sep 2017 10:56) (83 - 109)  BP: 106/67 (07 Sep 2017 09:45) (99/62 - 118/75)  BP(mean): 76 (07 Sep 2017 09:45) (72 - 86)  RR: 18 (07 Sep 2017 09:45) (0 - 29)  SpO2: 100% (07 Sep 2017 09:45) (96% - 100%)        PE:  Constitutional: frail looking, + trach   HEENT: NC/AT, EOMI, PERRLA  Neck: supple  Back: no tenderness  Respiratory: decreased breath sounds  Cardiovascular: S1S2 regular, no murmurs  Abdomen: soft, not tender, + gtube in place with some drainage  Genitourinary: deferred  Rectal: deferred  Musculoskeletal: no muscle tenderness, no joint swelling or tenderness  Extremities: no pedal edema  Neurological:  no focal deficits  Skin: no rashes    Labs:                        9.5    10.7  )-----------( 406      ( 07 Sep 2017 05:36 )             29.3     09-07    138  |  95<L>  |  83<H>  ----------------------------<  119<H>  4.4   |  33<H>  |  3.30<H>    Ca    8.9      07 Sep 2017 05:36  Phos  3.1     09-07  Mg     2.1     09-06    TPro  x   /  Alb  2.2<L>  /  TBili  x   /  DBili  x   /  AST  x   /  ALT  x   /  AlkPhos  x   09-07                  Cultures:         Culture - Blood in AM (09.03.17 @ 05:56)    Specimen Source: .Blood None    Culture Results:   No growth to date.    Culture - Blood in AM (09.03.17 @ 06:00)    Specimen Source: .Blood None    Culture Results:   No growth to date.      Culture - Blood (08.31.17 @ 17:29)    -  Methicillin resistant Staphylococcus aureus (MRSA): Detec    -  Candida parapsilosis: Nondet    -  Coagulase negative Staphylococcus: Nondet    -  Enterobacter cloacae complex: Nondet    -  Enterococcus species: Nondet    -  Escherichia coli: Nondet    -  Haemophilus influenzae: Nondet    -  Neisseria meningitidis: Nondet    -  Proteus species: Nondet    -  Pseudomonas aeruginosa: Nondet    -  Streptococcus agalactiae (Group B): Nondet    -  Acinetobacter baumanii: Nondet    -  Candida albicans: Nondet    -  Serratia marcescens: Nondet    -  Streptococcus pneumoniae: Nondet    -  Vancomycin resistant Enterococcus sp.: Nondet    Gram Stain:   Growth in aerobic bottle: Gram Positive Cocci in Clusters  Growth in anaerobic bottle: Gram Positive Cocci in Clusters    -  Candida glabrata: Nondet    -  Candida krusei: Nondet    -  Candida tropicalis: Nondet    -  Klebsiella oxytoca: Nondet    -  Klebsiella pneumoniae: Nondet    -  Listeria monocytogenes: Nondet    -  Multidrug (KPC pos) resistant organism: Nondet    -  Staphylococcus aureus: Nondet    -  Streptococcus pyogenes (Group A): Nondet    -  Streptococcus sp. (Not Grp A, B or S pneumoniae): Nondet    Specimen Source: .Blood Blood    Organism: Blood Culture PCR    Culture Results:   Growth in aerobic and anaerobic bottles: Staphylococcus aureus  ***Blood Panel PCR results on this specimen are available  approximately 3 hours after the Gram stain result.***  Gram stain, PCR, and/or culture results may not always  correspond dueto difference in methodologies.    Organism Identification: Blood Culture PCR    Method Type: PCR    Culture - Sputum . (09.01.17 @ 12:45)    -  Ampicillin/Sulbactam: R <=8/4    -  Ciprofloxacin: R >2    -  Erythromycin: R >4    -  Penicillin: R 8    Gram Stain:   No squamous epithelial cells per low power field  No polymorphonuclear cells seen per low power field  Few Gram Negative Rods per oil power field  Few Gram Positive Cocci in Clusters per oil power field    -  Gentamicin: S <=1    -  Levofloxacin: S 2    -  Tetra/Doxy: S <=1    -  Ceftazidime: S 4    -  Clindamycin: R >4    -  Oxacillin: R >2    -  RIF- Rifampin: S <=1    -  Trimethoprim/Sulfamethoxazole: S <=0.5/9.5    -  Trimethoprim/Sulfamethoxazole: S <=0.5/9.5    -  Cefazolin: R >16    -  Levofloxacin: R >4    -  Linezolid: S 2    -  Moxifloxacin(Aerobic): R >4    -  Vancomycin: S 2    Specimen Source: .Sputum Sputum    Culture Results:   Moderate Methicillin resistant Staphylococcus aureus  Numerous Stenotrophomonas maltophilia  Normal Respiratory Ekaterina absent    Organism Identification: Methicillin resistant Staphylococcus aureus  Stenotrophomonas maltophilia    Organism: Methicillin resistant Staphylococcus aureus    Organism: Stenotrophomonas maltophilia    Method Type: AWILDA    Method Type: AWILDA            Radiology:  TTE no obvious vegetations     < from: CT Chest No Cont (08.31.17 @ 17:58) >  EXAM:  CT CHEST                            PROCEDURE DATE:  08/31/2017          INTERPRETATION:  CT CHEST    HISTORY:  respiratory failure                     TECHNIQUE: Noncontrast CT of the chest was performed. Coronal and   sagittal images were reconstructed. This study was performed using   automatic exposure control (radiation dose reduction software) to obtain   a diagnostic image quality scan with patient dose as low as reasonably   achievable.    COMPARISON: Chest x-ray from the same day    FINDINGS:    LUNGS, AIRWAYS: Tracheostomy in place. The central airways are patent.   Mild interstitial and alveolar pulmonary edema. Bibasilar compressive   atelectasis with near complete collapse of both lower lobes.    PLEURA: Moderate loculated right hydropneumothorax pleural thickening.   Moderate layering left pleural effusion.    HEART AND VESSELS: Status post CABG. Right IJ dialysis catheter tip in   the right atrium. Normal heart size. No pericardial effusion. Extensive   aortic valve and coronary calcification. Atherosclerotic thoracic aorta   is normal in caliber.    MEDIASTINUM AND VIKI: No adenopathy.    UPPER ABDOMEN: Small ascites. Gallstones. Percutaneous gastrostomy tube   in place. Splenectomy.    BONES AND SOFT TISSUES:Status post sternotomy. No acute bony abnormality.    IMPRESSION:     Mild pulmonary interstitial and alveolar edema.    Near-complete collapse of both lower lobes secondary to compression   atelectasis.    Moderate loculated right hydropneumothorax with associated pleural   thickening. Moderate layering left pleural effusion with associated   pleural thickening.      < end of copied text >      Advanced directives addressed: full resuscitation

## 2017-09-07 NOTE — SWALLOW BEDSIDE ASSESSMENT ADULT - SLP GENERAL OBSERVATIONS
Pt seen at bedside. On encounter, a head drop was noted and a loss of bulk was evident in strap muscle region. A cuffed tracheostomy was in place with cuff in inflated position. He is on a vent  Pt was alert and able to verbalize during communicative probes. His motor speech and linguistic abilities were intact. However, Aphonia was evident. Pt was able to mouth intents nonetheless and supplement mouthed intents via writing.  Note that pt stated that PTH. he was taking small amounts of clear liquids for quality of purposes. Aspiration signs with clear liquids denied, Pt stated that he is not allowed to eat solids due to GI history as stated above. Pt also stated that he is aware that primary source of hydration/nutrition will be non orally via PEG. Pt seen at bedside. On encounter, a head drop was noted and a loss of bulk was evident in strap muscle region. A cuffed tracheostomy was in place with cuff in mandatory inflated position. He is on a vent  Pt was alert and able to verbalize during communicative probes. His motor speech and linguistic abilities were intact. However, Aphonia was evident. Pt was able to mouth intents nonetheless and supplement mouthed intents via writing.  Note that pt stated that PTH. he was orally taking small amounts of clear liquids for quality of purposes. Aspiration signs with clear liquids denied, Pt stated that he is not allowed to eat solids due to GI history as stated above. Pt also stated that he is aware that primary source of hydration/nutrition will be non orally via PEG.

## 2017-09-07 NOTE — SWALLOW BEDSIDE ASSESSMENT ADULT - SWALLOW EVAL: DIAGNOSIS
1) The patient demonstrates age acceptable Oral Swallowing atop chronic Pharyngeal Dysphagia which subjectively appeared to be a grossly functional condition for a restricted inventory of modified food textures, No overt aspiration signs were exhibited with thin liquids on exam and no food dye noted in tracheal secretions on exam with thin liquids, More viscous food textures not offered given his GI history which includes gastroparesis/reduced esophageal motility,  Muscle wasting and presence of cuffed trachesotomy with vent hardware were felt to be contributory to Pharyngeal Dysphagia.  2) Pt demonstrates Aphonia in setting of cuffed tracheostomy placement without evidence of an overt primary speech-language pathology. He is able to mouth intents and supplement verbalizations via writing. 1) The patient demonstrates age acceptable Oral Swallowing atop chronic Pharyngeal Dysphagia which subjectively appeared to be a grossly functional condition for a restricted inventory of modified food textures, No overt aspiration signs were exhibited with thin liquids on exam and no food dye noted in tracheal secretions on exam with thin liquids, More viscous food textures not offered given his GI history which includes gastroparesis/reduced esophageal motility,  Muscle wasting and presence of cuffed trachesotomy with vent hardware were felt to be contributory to Pharyngeal Dysphagia.  2) Pt demonstrates Aphonia in setting of cuffed tracheostomy placement without evidence of an overt primary speech-language pathology. He is able to mouth intents nonetheless and supplement verbalizations via writing.

## 2017-09-07 NOTE — SWALLOW BEDSIDE ASSESSMENT ADULT - ASR SWALLOW RECOMMEND DIAG
Defer MBS as pt appears clinically tolerant of suggested PO textures from an oropharyngeal swallowing stance and he appeared to tolerate initial bedside swallow testing with food dye.

## 2017-09-07 NOTE — PROGRESS NOTE ADULT - SUBJECTIVE AND OBJECTIVE BOX
67 year old male with PMH significant for:                     Hodgkings Disease treated with Radiation and RT                     CAD - s/p cath in 2009, cabg x 4                     chronic pleural effusion Right chest(s/p VATS 2016)                     aspiration Pneumonia 2016,                      ESRD on Dialysis since 2016                     Rash to vanco, or zoloft on steroid taper                     chronic vent dependence                     hypothyroidism  In nursing home pt developed increased HR with cyanosis - O2 Sat was ok  In ED pt with high peak pressures, trach may have been positional but WBC noted to be 30k whereas it was 14k at rwin.    Sputum and blood cultures (+) for MRSA  Pts tunnelled HD cath was removed when bacteremia identified as thought to be potential source  subsequent cultures have been NGTD.    ECHO EF 60% - No evidence of vegetation.    above d/w Dr Covarrubias who has been caring for the patient.    9/5: this morning pt refusing most interventions and is agitated and uncooperative with nursing staff, currently he is calm and cooperative - issues and plans discussed in detail with him and all questions answered - he wants to get OOB and ambulate if possible.    9/7: d/w Dr Covarrubias who cared for patient yesterday.  No new issues - no overnight events.  Hemodynamics reasonable.  Creatinine rising but no acute indications for HD.          Allergies    epinephrine (Unknown)  fentanyl (Unknown)  Reglan (Unknown)  Vancomycin Hydrochloride (Rash)  Zoloft (Unknown)    ICU Vital Signs Last 24 Hrs  T(C): 36.6 (07 Sep 2017 05:00), Max: 36.6 (07 Sep 2017 00:00)  T(F): 97.9 (07 Sep 2017 05:00), Max: 97.9 (07 Sep 2017 05:00)  HR: 85 (07 Sep 2017 10:56) (83 - 109)  BP: 106/67 (07 Sep 2017 09:45) (99/62 - 118/75)  BP(mean): 76 (07 Sep 2017 09:45) (72 - 86)  ABP: --  ABP(mean): --  RR: 18 (07 Sep 2017 09:45) (0 - 29)  SpO2: 100% (07 Sep 2017 09:45) (96% - 100%)      Mode: AC/ CMV (Assist Control/ Continuous Mandatory Ventilation)  RR (machine): 14  TV (machine): 400  FiO2: 40  PEEP: 5  PS:   ITime: 1  MAP: 12  PIP: 30      I&O's Summary    06 Sep 2017 07:01  -  07 Sep 2017 07:00  --------------------------------------------------------  IN: 1200 mL / OUT: 1275 mL / NET: -75 mL                              9.5    10.7  )-----------( 406      ( 07 Sep 2017 05:36 )             29.3       09-07    138  |  95<L>  |  83<H>  ----------------------------<  119<H>  4.4   |  33<H>  |  3.30<H>    Ca    8.9      07 Sep 2017 05:36  Phos  3.1     09-07  Mg     2.1     09-06    TPro  x   /  Alb  2.2<L>  /  TBili  x   /  DBili  x   /  AST  x   /  ALT  x   /  AlkPhos  x   09-07    LIVER FUNCTIONS - ( 07 Sep 2017 05:36 )  Alb: 2.2 g/dL / Pro: x     / ALK PHOS: x     / ALT: x     / AST: x     / GGT: x             PT/INR - ( 07 Sep 2017 05:36 )   PT: 13.2 sec;   INR: 1.22 ratio         PTT - ( 07 Sep 2017 05:36 )  PTT:28.2 sec    MEDICATIONS  (STANDING):  metoprolol 12.5 milliGRAM(s) Oral two times a day  levothyroxine 150 MICROGram(s) Oral daily  heparin  Injectable 5000 Unit(s) SubCutaneous every 12 hours  predniSONE   Tablet 50 milliGRAM(s) Oral daily  DAPTOmycin IVPB   IV Intermittent   DAPTOmycin IVPB 460 milliGRAM(s) IV Intermittent every 48 hours  torsemide 40 milliGRAM(s) Oral daily  omeprazole Suspension 40 milliGRAM(s) Oral daily  chlorhexidine 0.12% Liquid 5 milliLiter(s) Swish and Spit two times a day  cefTAZidime  IVPB 1 Gram(s) IV Intermittent every 24 hours    MEDICATIONS  (PRN):  ALPRAZolam 0.5 milliGRAM(s) Oral at bedtime PRN Sleep  oxyCODONE    5 mG/acetaminophen 325 mG 1 Tablet(s) Oral every 6 hours PRN Moderate Pain (4 - 6) 67 year old male with PMH significant for:                     Hodgkings Disease treated with Radiation and RT                     CAD - s/p cath in 2009, cabg x 4                     chronic pleural effusion Right chest(s/p VATS 2016)                     aspiration Pneumonia 2016,                      ESRD on Dialysis since 2016                     Rash to vanco, or zoloft on steroid taper                     chronic vent dependence                     hypothyroidism  In nursing home pt developed increased HR with cyanosis - O2 Sat was ok  In ED pt with high peak pressures, trach may have been positional but WBC noted to be 30k whereas it was 14k at rwin.    Sputum and blood cultures (+) for MRSA  Pts tunnelled HD cath was removed when bacteremia identified as thought to be potential source  subsequent cultures have been NGTD.    ECHO EF 60% - No evidence of vegetation.    above d/w Dr Covarrubias who has been caring for the patient.    9/5: this morning pt refusing most interventions and is agitated and uncooperative with nursing staff, currently he is calm and cooperative - issues and plans discussed in detail with him and all questions answered - he wants to get OOB and ambulate if possible.    9/7: d/w Dr Covarrubias who cared for patient yesterday.  No new issues - no overnight events.  Hemodynamics reasonable.  Creatinine rising but no acute indications for HD.          Allergies    epinephrine (Unknown)  fentanyl (Unknown)  Reglan (Unknown)  Vancomycin Hydrochloride (Rash)  Zoloft (Unknown)    ICU Vital Signs Last 24 Hrs  T(C): 36.6 (07 Sep 2017 05:00), Max: 36.6 (07 Sep 2017 00:00)  T(F): 97.9 (07 Sep 2017 05:00), Max: 97.9 (07 Sep 2017 05:00)  HR: 85 (07 Sep 2017 10:56) (83 - 109)  BP: 106/67 (07 Sep 2017 09:45) (99/62 - 118/75)  BP(mean): 76 (07 Sep 2017 09:45) (72 - 86)  ABP: --  ABP(mean): --  RR: 18 (07 Sep 2017 09:45) (0 - 29)  SpO2: 100% (07 Sep 2017 09:45) (96% - 100%)      Mode: AC/ CMV (Assist Control/ Continuous Mandatory Ventilation)  RR (machine): 14  TV (machine): 400  FiO2: 40  PEEP: 5  PS:   ITime: 1  MAP: 12  PIP: 30      I&O's Summary    06 Sep 2017 07:01  -  07 Sep 2017 07:00  --------------------------------------------------------  IN: 1200 mL / OUT: 1275 mL / NET: -75 mL                              9.5    10.7  )-----------( 406      ( 07 Sep 2017 05:36 )             29.3       09-07    138  |  95<L>  |  83<H>  ----------------------------<  119<H>  4.4   |  33<H>  |  3.30<H>    Ca    8.9      07 Sep 2017 05:36  Phos  3.1     09-07  Mg     2.1     09-06    TPro  x   /  Alb  2.2<L>  /  TBili  x   /  DBili  x   /  AST  x   /  ALT  x   /  AlkPhos  x   09-07    LIVER FUNCTIONS - ( 07 Sep 2017 05:36 )  Alb: 2.2 g/dL / Pro: x     / ALK PHOS: x     / ALT: x     / AST: x     / GGT: x             PT/INR - ( 07 Sep 2017 05:36 )   PT: 13.2 sec;   INR: 1.22 ratio         PTT - ( 07 Sep 2017 05:36 )  PTT:28.2 sec    MEDICATIONS  (STANDING):  metoprolol 12.5 milliGRAM(s) Oral two times a day  levothyroxine 150 MICROGram(s) Oral daily  heparin  Injectable 5000 Unit(s) SubCutaneous every 12 hours  predniSONE   Tablet 50 milliGRAM(s) Oral daily  DAPTOmycin IVPB   IV Intermittent   DAPTOmycin IVPB 460 milliGRAM(s) IV Intermittent every 48 hours  torsemide 40 milliGRAM(s) Oral daily  omeprazole Suspension 40 milliGRAM(s) Oral daily  chlorhexidine 0.12% Liquid 5 milliLiter(s) Swish and Spit two times a day  cefTAZidime  IVPB 1 Gram(s) IV Intermittent every 24 hours    MEDICATIONS  (PRN):  ALPRAZolam 0.5 milliGRAM(s) Oral at bedtime PRN Sleep  oxyCODONE    5 mG/acetaminophen 325 mG 1 Tablet(s) Oral every 6 hours PRN Moderate Pain (4 - 6)      Review of Systems:   · Negative General Symptoms	no fever; no chills	  · Negative Skin Symptoms	no itching	  · Negative Ophthalmologic Symptoms	no diplopia; no photophobia	  · Negative ENMT Symptoms	no dry mouth; no dysphagia	  · Negative Respiratory and Thorax Symptoms	no wheezing; no dyspnea	  · Negative Cardiovascular Symptoms	no chest pain; no palpitations	  · Negative Gastrointestinal Symptoms	no nausea; no vomiting; no diarrhea	  · Negative General Genitourinary Symptoms	no dysuria	  · Negative Musculoskeletal Symptoms	generalized muscle weakness and debility	  · Negative Neurological Symptoms	no syncope; no headache	  · Negative Psychiatric Symptoms	no suicidal ideation; no anxiety	  · Additional ROS	ALL other ROS are negative.	    Physical Exam:   · Constitutional	detailed exam	  · Constitutional Details	no distress	  · Eyes	detailed exam	  · Eyes Details	PERRL; EOMI	  · ENMT	detailed exam	  · ENMT Details	mouth	  · Mouth	moist	  · ENMT Comments	trach in situ	  · Neck	detailed exam	  · Neck Details	supple; no JVD	  · Back	detailed exam	  · Back Details	normal shape	  · Respiratory	detailed exam	  · Respiratory Details	airway patent; breath sounds equal; good air movement; respirations non-labored	  · Cardiovascular	detailed exam	  · Cardiovascular Details	regular rate and rhythm 	  · Gastrointestinal	detailed exam	  · GI Normal	soft; nontender; no distention; bowel sounds normal; PEG in situ	  · Extremities	detailed exam	  · Extremities Details	no clubbing; no cyanosis	  · Vascular	Equal and normal pulses (carotid, femoral, dorsalis pedis)	  · Neurological	detailed exam	  · Neurological Details	alert and oriented x 3	  · Motor	No gross focal deficits - HERNANDEZ spont	  · Skin	detailed exam	  · Skin Details	warm and dry	  · Musculoskeletal	detailed exam	  · Musculoskeletal Details	ROM intact	  · Psychiatric	detailed exam	  · Psychiatric Details	normal affect; normal behavior	      DVT Prophylaxis: SQ heparin, venodynes    Advanced Directives: FULL  Discussed with: patient    Visit Information: SSQ    ** Time is exclusive of billed procedures and/or teaching and/or routine family updates.

## 2017-09-07 NOTE — SWALLOW BEDSIDE ASSESSMENT ADULT - ORAL PREPARATORY PHASE
Pt was oriented to feeding situ and readily accepted PO. However, he tended to accept PO in small volumes. Labial grading on utensils was functional.

## 2017-09-07 NOTE — PROGRESS NOTE ADULT - ASSESSMENT
This patient is a 67 year old male with PMH of Hodgkins disease s/p XRT/chemo, CAD s/p cath 2009, CABG X 4, chronic R pleural effusion s/p VATS 206, asp , ESRD on HD since May 2016, chronic resp failure s/p trach, vent dependent, hypothyroidism admitted 8/31 from NH where he developed tachycardia and cyanosis, was saturating normally, not hypoxic but was sent for further eval, here afebrile, wbc ct elevated to 30, CT chest showed moderate R loculated hydropneumothorax with pleural thickening/ L pleural effusion/pleural thickening, 4 bottles blood cx growing MRSA, + perm catheter in place for past 2 months, has vanco allergy, was given IV dapto/cefepime.     1. MRSA sepsis/probable tessio-related infection/chronic resp failure s/p trach/R loculated hydropneumothorax/L pleural effusion/ESRD/immunocompromised hos  - slowly improving, no fevers, wbc ct normalized, perm-catheter removed  - blood cx with MRSA 4 bottles on 8/31  - repeat blood cx 9/3 no growth  - on IV daptomycin  460mg q48h, post dialysis on HD days, cpk weekly check (baseline normal) #7  - will require 4 weeks of daptomycin 460mg q48h until 9/30/17 with weekly cbc, cmp, esr, crp and cpk  - sputum cx growing stenotrophamonas/MRSA  - has R hydropneumothorax/loculated effusion but likely chronic than acute, hx of prior VATS, sputum cx likely d/t colonization  - completed 3 days of cefepime, switched to ceftazidime 1 gm q24h for steno coverage #4  - can switch upon d/c to levaquin per peg tube 250mg daily for total 7 days  - if any worsening in resp status, recommend thoracentesis/drainage of effusion  - dialysis on hold for now, Cr trending up, may need hd, plan if so for cath insertion 9/8  - f/u cbc  - trach care  - -tolerating abx well so far; no side effects noted  -reason for abx use and side effects reviewed with patient  - supportive care

## 2017-09-07 NOTE — PROGRESS NOTE ADULT - ASSESSMENT
66yo M suffering from MRSA sepsis and bacteremia - tunnelled HD cath thought to be source, though HCAP is other possible source.  acute on chronic respiratory failure due to difficulty ventilating - improved with tracheostomy tube manipulation  Chronic right pleural effusion, patient was advised in past not to have intervention  Tunnelled HD cath removed 9/2.  No acute need for HD today  remains chronically vent dependent  hemodynamics reasonable  DRESS syndrome   ? hx of diastolic chf - pt reports RIGHT sided pericardial constriction as cause - Not on ACE or ARB due to poor renal function which required initiation of HD about 3 months ago and now borderline renal function which may or may not require continued HD.  EF 60%    Plan at this time is for continued care in CCU  HOB 30  GI and DVT prophylaxis as appropriate  monitor cultures  ABx  Mobilize, PT  TF's  Notably pt was on "clear liquids TID at rehab center - will need swallow eval prior to further PO intake.  continue steroid taper  patient may need new HD cath by IR or Vasc sx if HD again indicated - today is day 5 without indwelling catheter - making reasonable urine and no lyte or acid base issues this far - will monitor renal function and amend plan accordingly.  supportive care    Discussed with patient in detail and all questions answered - pt is optimistic that he will no longer need HD and then can be transfer to a facility closer to [facility] where his doctors are and where he is closer to his home.

## 2017-09-07 NOTE — SWALLOW BEDSIDE ASSESSMENT ADULT - SWALLOW EVAL: SECRETION MANAGEMENT
No drooling noted. Note that strength of volitional cough is naturally reduced given presence of cuffed tracheostomy that is inflated.

## 2017-09-07 NOTE — SWALLOW BEDSIDE ASSESSMENT ADULT - SWALLOW EVAL: STRUCTURAL ABNORMALITIES
Notable for a head drop, loss of bulk in strap muscle region and presence of cuffed tracheostomy which is inflated.

## 2017-09-07 NOTE — SWALLOW BEDSIDE ASSESSMENT ADULT - DIET PRIOR TO ADMI
The patient is reportedly on a clear liquid diet at [facility] 3x a day but oral intake is reportedly decreased and he is nutritionally maintained primarily non orally via PEG.

## 2017-09-07 NOTE — PROGRESS NOTE ADULT - SUBJECTIVE AND OBJECTIVE BOX
NEPHROLOGY INTERVAL HPI/OVERNIGHT EVENTS:   SY  No acute events overnight.  Alert.  No new complaints.     SY  Alert.  No acute events overnight.      awake alert no new issues  wants to hold off HD, thinks has adequate renal function to go back to rehab at [facility] near his house, off dialysis  d/w Dr Lamont Wheeler     SY  Alert.  On vent.    No acute events overnight.    9/3 SY  Permcath removed due to MRSA bacteremia.  No acute events.  Alert and responsive on vent/trach.     SY  S/p HD yesterday.  Now positive blood culture with G + in clusters.  Pt fully alert and responsive.  Understands the need to remove permcath.  Reeducated pt to limit po water intake.      HPI:  This patient is a 67 year old male with PMH significant for:                     Hodgkings Disease treated with Radiation and RT                     CAD - s/p cath in , cabg x 4                     chronic pleural effusion Right chest(s/p VATS )                     aspiration Pneumonia ,                      ESRD on Dialysis since                      Rash to vanco, or zoloft on steroid taper                     chronic vent dependence                     hypothyroidism  Yesterday nursing home developed increased HR with cyanosis sat ok  In ED there was high peak pressures, trach may have been positional but wbc has increased to 30, was 14 at [facility]..  Admitted for evaluation, needs HD today.  Initially refused, then agreed to 3 hr hd       PAST MEDICAL & SURGICAL HISTORY:  Hypothyroidism  ESRD on hemodialysis  Angina pectoris  Hodgkin's lymphoma  Tracheostomy dependent  Anemia        MEDICATIONS  (STANDING):  metoprolol 12.5 milliGRAM(s) Oral two times a day  levothyroxine 150 MICROGram(s) Oral daily  heparin  Injectable 5000 Unit(s) SubCutaneous every 12 hours  predniSONE   Tablet 50 milliGRAM(s) Oral daily  DAPTOmycin IVPB   IV Intermittent   DAPTOmycin IVPB 460 milliGRAM(s) IV Intermittent every 48 hours  torsemide 40 milliGRAM(s) Oral daily  omeprazole Suspension 40 milliGRAM(s) Oral daily  chlorhexidine 0.12% Liquid 5 milliLiter(s) Swish and Spit two times a day  cefTAZidime  IVPB 1 Gram(s) IV Intermittent every 24 hours    MEDICATIONS  (PRN):  ALPRAZolam 0.5 milliGRAM(s) Oral at bedtime PRN Sleep  oxyCODONE    5 mG/acetaminophen 325 mG 1 Tablet(s) Oral every 6 hours PRN Moderate Pain (4 - 6)          Vital Signs Last 24 Hrs  T(C): 36.6 (07 Sep 2017 05:00), Max: 36.6 (07 Sep 2017 00:00)  T(F): 97.9 (07 Sep 2017 05:00), Max: 97.9 (07 Sep 2017 05:00)  HR: 83 (07 Sep 2017 08:35) (83 - 109)  BP: 99/62 (07 Sep 2017 07:55) (99/62 - 118/75)  BP(mean): 72 (07 Sep 2017 07:55) (72 - 86)  RR: 19 (07 Sep 2017 07:55) (0 - 29)  SpO2: 100% (07 Sep 2017 07:55) (96% - 100%)  Daily     Daily Weight in k.8 (07 Sep 2017 01:00)     @ 07:01  -   @ 07:00  --------------------------------------------------------  IN: 1200 mL / OUT: 1275 mL / NET: -75 mL        PHYSICAL EXAM:  Alert and comfortable  GENERAL: No apparent distress  CHEST/LUNG: Fair air entry   HEART: S1S2 RRR  ABDOMEN: soft  EXTREMITIES: decreased edema  SKIN:     LABS:                        9.5    10.7  )-----------( 406      ( 07 Sep 2017 05:36 )             29.3     -07    138  |  95<L>  |  83<H>  ----------------------------<  119<H>  4.4   |  33<H>  |  3.30<H>    Ca    8.9      07 Sep 2017 05:36  Phos  3.1       Mg     2.1         TPro  x   /  Alb  2.2<L>  /  TBili  x   /  DBili  x   /  AST  x   /  ALT  x   /  AlkPhos  x       PT/INR - ( 07 Sep 2017 05:36 )   PT: 13.2 sec;   INR: 1.22 ratio         PTT - ( 07 Sep 2017 05:36 )  PTT:28.2 sec    Phosphorus Level, Serum: 3.1 mg/dL ( @ 05:36)          RADIOLOGY & ADDITIONAL TESTS:

## 2017-09-07 NOTE — SWALLOW BEDSIDE ASSESSMENT ADULT - ADDITIONAL RECOMMENDATIONS
1) Cease PO should blue food dye be noted in tracheal secretions over next 24 hours.     2) Accept liquids in single non repetitive sips. Cease PO should aspiration signs be noted when taking clear liquids.    3) Allow pt to supplement mouthed intents via writing. He has an I Pad at [facility]. 1) Cease PO should blue food dye be noted in tracheal secretions over next 24 hours.     2) Accept liquids in single non repetitive sips. Cease PO should aspiration signs be noted when taking clear liquids.    3) Allow pt to supplement mouthed intents via writing. He has an I Pad at bedside.

## 2017-09-07 NOTE — SWALLOW BEDSIDE ASSESSMENT ADULT - COMMENTS
Pt was admitted to  from [facility] with fevers, and tachycardia, Hosp course is notable for bacteremia/sepsis with MRSA, He had a dialysis cath that was removed in house due to possible infection. This profile is superimposed upon a prior history of ESRD for which he is on dialysis, hypothyroidism, anemia,  Hodgkins Dz s/p RT, angina, CAD s/p CABG, CHF, chronic right pleural effusion s/p VATS-chest tube placement, chronic respiratory failure warranting tracheostomy placement/vent, GERD, gastroparesis, decreased esophageal motility, nutrition compromise and PEG placement. Note that the patient stated that he was allowed to have clear liquids at [facility] for quality of life reasons, He also stated that his oral intake on liquid diet was limited and he is not cleared to eat foods more viscous then liquid given his GI issues. 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 He is aware that his primary means of obtaining nutritio/hydration would be non orally via PEG, even if he is cleared fort PO. Pt was admitted to  from [facility] with fevers, and tachycardia, Hosp course is notable for bacteremia/sepsis with MRSA, He had a dialysis cath that was removed in house due to possible infection. This profile is superimposed upon a prior history of ESRD for which he is on dialysis, hypothyroidism, anemia,  Hodgkins Dz s/p RT, angina, CAD s/p CABG, CHF, chronic right pleural effusion s/p VATS-chest tube placement, chronic respiratory failure warranting tracheostomy placement/vent dependence, GERD, gastroparesis, decreased esophageal motility, nutrition compromise and PEG placement. Note that the patient stated that he was allowed to have clear liquids at [facility] for quality of life reasons, He also stated that his oral intake on liquid diet was limited and he is not cleared to eat foods more viscous then liquid given his GI issues. 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 He is aware that his primary means of obtaining nutrition/hydration would be non orally via PEG, even if he is cleared fort PO.

## 2017-09-07 NOTE — PROGRESS NOTE ADULT - ASSESSMENT
This patient is a 67 year old male with PMH significant for: Hodgkings Disease treated with Radiation, CAD - s/p cath in 2009, cabg x 4, chronic pleural effusion Right chest, aspiration Pneumonia 2016, ESRD on Dialysis since 2016, Rash to vanco, or zoloft on steroid taper, chronic vent dependence, hypothyroidism..  Yesterday nursing home developed increased HR with cyanosis sat ok  In ED there was high peak pressures, trach may have been positional but wbc has increased to 30, was 14 at [facility]..  Admitted for evaluation, needs HD today.  Initially refused, then agreed to 3 hr hd   Medical management as per ICU  EPO as per outpt orders    9/1 addendum 3:15 pm  seen on hd doing well    9/2 SY  --Pt with APRYL on HD since 5/2017.  Unclear if renal recovery can be expected.   Pt's pre HD creat was only 3.1.  Now found with positive blood culture with gram positive cocci.   Most likely line sepsis.  Will remove catheter today.  Will continue to monitor for any renal recovery.  Limit free water intake to prevent hyponatremia.  Continue diuretics.    9/3 SY  --Permcath removed due to MRSA bacteremia.  Pt on HD since 5/2017 due to ATN /Sepsis.  Unclear if enough renal recovery.  Will follow trend for next 2-3 days to determine residual renal fx.  --Continue abtx for bacteremia.    9/4 SY  --MRSA Bacteremia.  Continue ABTX.  --Creat continues to slowly rise.   Again unclear if pt has enough residula fx to remain off dialysis.  Continue to assess daily.  --If creat continues to rise, then will plan Permcath placement in 2-3 days.     9/5  MRSA bacteremia, on Cubicin  - last hd friday, catheter removed due to MRSA, stable at this time  awake alert no new issues  wants to hold off HD, thinks has adequate renal function to go back to rehab at [facility] near his house, off dialysis  d/w Dr Lamont Wheeler, agrees to stay for now    9/6 SY  --APRYL on HD, last TX on 9/1  Renal parameters continue to slowly increase,  therefore, even with adequate urine volume, pt most anjelicaley will need to restart HD   Will monitor for another 1-2 days.  Pt now agreeable with plan for possible permcath placement on 9/8. Friday.  --MRSA bacteremia --continue abtx.    9/7 SY  --APRYL on temporary HD support.  Last tx on 9/1.  Urine output has been adequate.   Unclear if renal recovery may be adequate to remain off dialysis. (  increase in creat may be low due to muscle wasting.)  Will attempt to confirm residual renal fx prior to placing permcath with 24 hour urine collection.  --Fluid and electrolytes stable at this point.  --MRSA bacteremia.  Continue abtx.

## 2017-09-07 NOTE — SWALLOW BEDSIDE ASSESSMENT ADULT - PHARYNGEAL PHASE
Swallow trigger was timely to slightly latent. Laryngeal lift on palpation during swallow trials was somewhat reduced but considered to be grossly functional with above limited foods.  Laryngeal drag effect from tracheostomy placement was suspected. No behavioral aspiration signs exhibited. No food dye noted in tracheal secretions.

## 2017-09-08 LAB
ALBUMIN SERPL ELPH-MCNC: 2.1 G/DL — LOW (ref 3.3–5)
ANION GAP SERPL CALC-SCNC: 10 MMOL/L — SIGNIFICANT CHANGE UP (ref 5–17)
BUN SERPL-MCNC: 93 MG/DL — HIGH (ref 7–23)
CALCIUM SERPL-MCNC: 8.5 MG/DL — SIGNIFICANT CHANGE UP (ref 8.5–10.1)
CHLORIDE SERPL-SCNC: 93 MMOL/L — LOW (ref 96–108)
CO2 SERPL-SCNC: 33 MMOL/L — HIGH (ref 22–31)
CREAT SERPL-MCNC: 3.4 MG/DL — HIGH (ref 0.5–1.3)
CREAT SERPL-MCNC: 3.41 MG/DL — HIGH (ref 0.5–1.3)
CULTURE RESULTS: SIGNIFICANT CHANGE UP
CULTURE RESULTS: SIGNIFICANT CHANGE UP
GLUCOSE SERPL-MCNC: 118 MG/DL — HIGH (ref 70–99)
HCT VFR BLD CALC: 29.4 % — LOW (ref 39–50)
HGB BLD-MCNC: 9.7 G/DL — LOW (ref 13–17)
MAGNESIUM SERPL-MCNC: 2 MG/DL — SIGNIFICANT CHANGE UP (ref 1.6–2.6)
MCHC RBC-ENTMCNC: 32.8 GM/DL — SIGNIFICANT CHANGE UP (ref 32–36)
MCHC RBC-ENTMCNC: 33.5 PG — SIGNIFICANT CHANGE UP (ref 27–34)
MCV RBC AUTO: 102.1 FL — HIGH (ref 80–100)
PHOSPHATE SERPL-MCNC: 3.2 MG/DL — SIGNIFICANT CHANGE UP (ref 2.5–4.5)
PLATELET # BLD AUTO: 382 K/UL — SIGNIFICANT CHANGE UP (ref 150–400)
POTASSIUM SERPL-MCNC: 4.4 MMOL/L — SIGNIFICANT CHANGE UP (ref 3.5–5.3)
POTASSIUM SERPL-SCNC: 4.4 MMOL/L — SIGNIFICANT CHANGE UP (ref 3.5–5.3)
RBC # BLD: 2.88 M/UL — LOW (ref 4.2–5.8)
RBC # FLD: 18.3 % — HIGH (ref 10.3–14.5)
SODIUM SERPL-SCNC: 136 MMOL/L — SIGNIFICANT CHANGE UP (ref 135–145)
SPECIMEN SOURCE: SIGNIFICANT CHANGE UP
SPECIMEN SOURCE: SIGNIFICANT CHANGE UP
WBC # BLD: 10.2 K/UL — SIGNIFICANT CHANGE UP (ref 3.8–10.5)
WBC # FLD AUTO: 10.2 K/UL — SIGNIFICANT CHANGE UP (ref 3.8–10.5)

## 2017-09-08 RX ADMIN — OXYCODONE AND ACETAMINOPHEN 1 TABLET(S): 5; 325 TABLET ORAL at 21:56

## 2017-09-08 RX ADMIN — CHLORHEXIDINE GLUCONATE 5 MILLILITER(S): 213 SOLUTION TOPICAL at 07:05

## 2017-09-08 RX ADMIN — Medication 40 MILLIGRAM(S): at 07:32

## 2017-09-08 RX ADMIN — Medication 50 MILLIGRAM(S): at 07:06

## 2017-09-08 RX ADMIN — Medication 12.5 MILLIGRAM(S): at 11:46

## 2017-09-08 RX ADMIN — Medication 0.5 MILLIGRAM(S): at 21:56

## 2017-09-08 RX ADMIN — OMEPRAZOLE 40 MILLIGRAM(S): 10 CAPSULE, DELAYED RELEASE ORAL at 11:47

## 2017-09-08 RX ADMIN — CHLORHEXIDINE GLUCONATE 5 MILLILITER(S): 213 SOLUTION TOPICAL at 18:06

## 2017-09-08 RX ADMIN — OXYCODONE AND ACETAMINOPHEN 1 TABLET(S): 5; 325 TABLET ORAL at 22:00

## 2017-09-08 RX ADMIN — Medication 150 MICROGRAM(S): at 07:07

## 2017-09-08 NOTE — PROGRESS NOTE ADULT - ASSESSMENT
This patient is a 67 year old male with PMH of Hodgkins disease s/p XRT/chemo, CAD s/p cath 2009, CABG X 4, chronic R pleural effusion s/p VATS 206, asp , ESRD on HD since May 2016, chronic resp failure s/p trach, vent dependent, hypothyroidism admitted 8/31 from NH where he developed tachycardia and cyanosis, was saturating normally, not hypoxic but was sent for further eval, here afebrile, wbc ct elevated to 30, CT chest showed moderate R loculated hydropneumothorax with pleural thickening/ L pleural effusion/pleural thickening, 4 bottles blood cx growing MRSA, + perm catheter in place for past 2 months, has vanco allergy, was given IV dapto/cefepime.     1. MRSA sepsis/probable tessio-related infection/chronic resp failure s/p trach/R loculated hydropneumothorax/L pleural effusion/ESRD/immunocompromised host/rash    - slowly improving, no fevers, wbc ct normalized, perm-catheter removed   - has had erythematous rash for past few days   - no reported drug allergies other than intolerance to vancomycin  - ?precipitated by ezcema? drug rash?  - blood cx with MRSA 4 bottles on 8/31; repeat blood cx 9/3 no growth  - on IV daptomycin  460mg q48h, post dialysis on HD days, cpk weekly check (baseline normal) #8  - will require 4 weeks of daptomycin 460mg q48h until 9/30/17 with weekly cbc, cmp, esr, crp and cpk  - sputum cx growing stenotrophamonas/MRSA  - has R hydropneumothorax/loculated effusion but likely chronic than acute, hx of prior VATS, sputum cx likely d/t colonization  - completed 3 days of cefepime and 5 days of ceftazidime   - will d/c ceftazidime may be contributing to rash?  - continue with steroids/antihistamines   - if any worsening in resp status, recommend thoracentesis/drainage of effusion  - dialysis on hold for now, Cr trending up, may need hd, plan if so for cath insertion  - f/u cbc  - trach care  - -tolerating abx well so far; no side effects noted  -reason for abx use and side effects reviewed with patient  - supportive care

## 2017-09-08 NOTE — PROGRESS NOTE ADULT - ASSESSMENT
66yo M suffering from MRSA sepsis and bacteremia - tunnelled HD cath thought to be source, though HCAP is other possible source.  acute on chronic respiratory failure due to difficulty ventilating - improved with tracheostomy tube manipulation  Chronic right pleural effusion, patient was advised in past not to have intervention  Tunnelled HD cath removed 9/2.  No acute need for HD today  remains chronically vent dependent though is tolerating periods of PSV 10/5 40%  hemodynamics reasonable  DRESS syndrome   ? hx of diastolic chf - pt reports RIGHT sided pericardial constriction as cause - Not on ACE or ARB due to poor renal function which required initiation of HD about 3 months ago and now borderline renal function which may or may not require continued HD.  EF 60%  'cleared' by speech pathology for clear liquid intake by mouth    Plan at this time is for continued care in CCU  HOB 30  GI and DVT prophylaxis as appropriate  monitor cultures  ABx  Mobilize, PT  TF's + clear liquids TID  continue steroid taper  patient may need new HD cath by IR or Vasc sx if HD again indicated - today is day 6 without indwelling catheter - making reasonable urine and no lyte or acid base issues this far - will monitor renal function and amend plan accordingly.  supportive care    Discussed with patient in detail and all questions answered - pt is optimistic that he will no longer need HD and then can be transfer to a facility closer to [facility] where his doctors are and where he is closer to his home.

## 2017-09-08 NOTE — PROGRESS NOTE ADULT - SUBJECTIVE AND OBJECTIVE BOX
67 year old male with PMH significant for:                     Hodgkings Disease treated with Radiation and RT                     CAD - s/p cath in 2009, cabg x 4                     chronic pleural effusion Right chest(s/p VATS 2016)                     aspiration Pneumonia 2016,                      ESRD on Dialysis since 2016                     Rash to vanco, or zoloft on steroid taper                     chronic vent dependence                     hypothyroidism  In nursing home pt developed increased HR with cyanosis - O2 Sat was ok  In ED pt with high peak pressures, trach may have been positional but WBC noted to be 30k whereas it was 14k at rwin.    Sputum and blood cultures (+) for MRSA  Pts tunnelled HD cath was removed when bacteremia identified as thought to be potential source  subsequent cultures have been NGTD.    ECHO EF 60% - No evidence of vegetation.    above d/w Dr Covarrubias who has been caring for the patient.    9/5: this morning pt refusing most interventions and is agitated and uncooperative with nursing staff, currently he is calm and cooperative - issues and plans discussed in detail with him and all questions answered - he wants to get OOB and ambulate if possible.    9/7: d/w Dr Covarrubias who cared for patient yesterday.  No new issues - no overnight events.  Hemodynamics reasonable.  Creatinine rising but no acute indications for HD.    9/8: question of 'flare up' of pts rash - overnight  - he refused benadryl.  No new issues - hemodynamics reasonable.  Passed swallow eval for liquids yesterday.      Allergies    epinephrine (Unknown)  fentanyl (Unknown)  Reglan (Unknown)  Vancomycin Hydrochloride (Rash)  Zoloft (Unknown)      ICU Vital Signs Last 24 Hrs  T(C): 36.5 (08 Sep 2017 05:00), Max: 36.6 (08 Sep 2017 00:00)  T(F): 97.7 (08 Sep 2017 05:00), Max: 97.9 (08 Sep 2017 00:00)  HR: 103 (08 Sep 2017 08:00) (81 - 114)  BP: 99/62 (08 Sep 2017 07:15) (91/58 - 116/58)  BP(mean): 75 (08 Sep 2017 07:00) (75 - 78)  ABP: --  ABP(mean): --  RR: 23 (08 Sep 2017 08:00) (0 - 30)  SpO2: 100% (08 Sep 2017 07:48) (100% - 100%)      Mode: AC/ CMV (Assist Control/ Continuous Mandatory Ventilation)  RR (machine): 14  TV (machine): 400  FiO2: 40  PEEP: 5  ITime: 1  MAP: 11  PIP: 30      I&O's Summary    07 Sep 2017 07:01  -  08 Sep 2017 07:00  --------------------------------------------------------  IN: 1784 mL / OUT: 2550 mL / NET: -766 mL    08 Sep 2017 07:01  -  08 Sep 2017 12:02  --------------------------------------------------------  IN: 720 mL / OUT: 0 mL / NET: 720 mL                              9.7    10.2  )-----------( 382      ( 08 Sep 2017 05:54 )             29.4       09-08    136  |  93<L>  |  93<H>  ----------------------------<  118<H>  4.4   |  33<H>  |  3.40<H>    Ca    8.5      08 Sep 2017 05:54  Phos  3.2     09-08  Mg     2.0     09-08    TPro  x   /  Alb  2.1<L>  /  TBili  x   /  DBili  x   /  AST  x   /  ALT  x   /  AlkPhos  x   09-08    LIVER FUNCTIONS - ( 08 Sep 2017 05:54 )  Alb: 2.1 g/dL / Pro: x     / ALK PHOS: x     / ALT: x     / AST: x     / GGT: x           PT/INR - ( 07 Sep 2017 05:36 )   PT: 13.2 sec;   INR: 1.22 ratio         PTT - ( 07 Sep 2017 05:36 )  PTT:28.2 sec      MEDICATIONS  (STANDING):  metoprolol 12.5 milliGRAM(s) Oral two times a day  levothyroxine 150 MICROGram(s) Oral daily  heparin  Injectable 5000 Unit(s) SubCutaneous every 12 hours  predniSONE   Tablet 50 milliGRAM(s) Oral daily  DAPTOmycin IVPB   IV Intermittent   DAPTOmycin IVPB 460 milliGRAM(s) IV Intermittent every 48 hours  torsemide 40 milliGRAM(s) Oral daily  omeprazole Suspension 40 milliGRAM(s) Oral daily  chlorhexidine 0.12% Liquid 5 milliLiter(s) Swish and Spit two times a day    MEDICATIONS  (PRN):  ALPRAZolam 0.5 milliGRAM(s) Oral at bedtime PRN Sleep  oxyCODONE    5 mG/acetaminophen 325 mG 1 Tablet(s) Oral every 6 hours PRN Moderate Pain (4 - 6)  diphenhydrAMINE   Injectable 25 milliGRAM(s) IV Push every 6 hours PRN Rash and/or Itching 67 year old male with PMH significant for:                     Hodgkings Disease treated with Radiation and RT                     CAD - s/p cath in 2009, cabg x 4                     chronic pleural effusion Right chest(s/p VATS 2016)                     aspiration Pneumonia 2016,                      ESRD on Dialysis since 2016                     Rash to vanco, or zoloft on steroid taper                     chronic vent dependence                     hypothyroidism  In nursing home pt developed increased HR with cyanosis - O2 Sat was ok  In ED pt with high peak pressures, trach may have been positional but WBC noted to be 30k whereas it was 14k at rwin.    Sputum and blood cultures (+) for MRSA  Pts tunnelled HD cath was removed when bacteremia identified as thought to be potential source  subsequent cultures have been NGTD.    ECHO EF 60% - No evidence of vegetation.    above d/w Dr Covarrubias who has been caring for the patient.    9/5: this morning pt refusing most interventions and is agitated and uncooperative with nursing staff, currently he is calm and cooperative - issues and plans discussed in detail with him and all questions answered - he wants to get OOB and ambulate if possible.    9/7: d/w Dr Covarurbias who cared for patient yesterday.  No new issues - no overnight events.  Hemodynamics reasonable.  Creatinine rising but no acute indications for HD.    9/8: question of 'flare up' of pts rash - overnight  - he refused benadryl.  No new issues - hemodynamics reasonable.  Passed swallow eval for liquids yesterday.      Allergies    epinephrine (Unknown)  fentanyl (Unknown)  Reglan (Unknown)  Vancomycin Hydrochloride (Rash)  Zoloft (Unknown)      ICU Vital Signs Last 24 Hrs  T(C): 36.5 (08 Sep 2017 05:00), Max: 36.6 (08 Sep 2017 00:00)  T(F): 97.7 (08 Sep 2017 05:00), Max: 97.9 (08 Sep 2017 00:00)  HR: 103 (08 Sep 2017 08:00) (81 - 114)  BP: 99/62 (08 Sep 2017 07:15) (91/58 - 116/58)  BP(mean): 75 (08 Sep 2017 07:00) (75 - 78)  ABP: --  ABP(mean): --  RR: 23 (08 Sep 2017 08:00) (0 - 30)  SpO2: 100% (08 Sep 2017 07:48) (100% - 100%)      Mode: AC/ CMV (Assist Control/ Continuous Mandatory Ventilation)  RR (machine): 14  TV (machine): 400  FiO2: 40  PEEP: 5  ITime: 1  MAP: 11  PIP: 30      I&O's Summary    07 Sep 2017 07:01  -  08 Sep 2017 07:00  --------------------------------------------------------  IN: 1784 mL / OUT: 2550 mL / NET: -766 mL    08 Sep 2017 07:01  -  08 Sep 2017 12:02  --------------------------------------------------------  IN: 720 mL / OUT: 0 mL / NET: 720 mL                              9.7    10.2  )-----------( 382      ( 08 Sep 2017 05:54 )             29.4       09-08    136  |  93<L>  |  93<H>  ----------------------------<  118<H>  4.4   |  33<H>  |  3.40<H>    Ca    8.5      08 Sep 2017 05:54  Phos  3.2     09-08  Mg     2.0     09-08    TPro  x   /  Alb  2.1<L>  /  TBili  x   /  DBili  x   /  AST  x   /  ALT  x   /  AlkPhos  x   09-08    LIVER FUNCTIONS - ( 08 Sep 2017 05:54 )  Alb: 2.1 g/dL / Pro: x     / ALK PHOS: x     / ALT: x     / AST: x     / GGT: x           PT/INR - ( 07 Sep 2017 05:36 )   PT: 13.2 sec;   INR: 1.22 ratio         PTT - ( 07 Sep 2017 05:36 )  PTT:28.2 sec      MEDICATIONS  (STANDING):  metoprolol 12.5 milliGRAM(s) Oral two times a day  levothyroxine 150 MICROGram(s) Oral daily  heparin  Injectable 5000 Unit(s) SubCutaneous every 12 hours  predniSONE   Tablet 50 milliGRAM(s) Oral daily  DAPTOmycin IVPB   IV Intermittent   DAPTOmycin IVPB 460 milliGRAM(s) IV Intermittent every 48 hours  torsemide 40 milliGRAM(s) Oral daily  omeprazole Suspension 40 milliGRAM(s) Oral daily  chlorhexidine 0.12% Liquid 5 milliLiter(s) Swish and Spit two times a day    MEDICATIONS  (PRN):  ALPRAZolam 0.5 milliGRAM(s) Oral at bedtime PRN Sleep  oxyCODONE    5 mG/acetaminophen 325 mG 1 Tablet(s) Oral every 6 hours PRN Moderate Pain (4 - 6)  diphenhydrAMINE   Injectable 25 milliGRAM(s) IV Push every 6 hours PRN Rash and/or Itching    Review of Systems:   · Negative General Symptoms	no fever; no chills	  · Negative Skin Symptoms	no itching	  · Negative Ophthalmologic Symptoms	no diplopia; no photophobia	  · Negative ENMT Symptoms	no dry mouth; no dysphagia	  · Negative Respiratory and Thorax Symptoms	no wheezing; no dyspnea	  · Negative Cardiovascular Symptoms	no chest pain; no palpitations	  · Negative Gastrointestinal Symptoms	no nausea; no vomiting; no diarrhea	  · Negative General Genitourinary Symptoms	no dysuria	  · Negative Musculoskeletal Symptoms	generalized muscle weakness and debility	  · Negative Neurological Symptoms	no syncope; no headache	  · Negative Psychiatric Symptoms	no suicidal ideation; no anxiety	  · Additional ROS	ALL other ROS are negative.	    Physical Exam:   · Constitutional	detailed exam	  · Constitutional Details	no distress	  · Eyes	detailed exam	  · Eyes Details	PERRL; EOMI	  · ENMT	detailed exam	  · ENMT Details	mouth	  · Mouth	moist	  · ENMT Comments	trach in situ	  · Neck	detailed exam	  · Neck Details	supple; no JVD	  · Back	detailed exam	  · Back Details	normal shape	  · Respiratory	detailed exam	  · Respiratory Details	airway patent; breath sounds equal; good air movement; respirations non-labored	  · Cardiovascular	detailed exam	  · Cardiovascular Details	regular rate and rhythm 	  · Gastrointestinal	detailed exam	  · GI Normal	soft; nontender; no distention; bowel sounds normal; PEG in situ	  · Extremities	detailed exam	  · Extremities Details	no clubbing; no cyanosis	  · Vascular	Equal and normal pulses (carotid, femoral, dorsalis pedis)	  · Neurological	detailed exam	  · Neurological Details	alert and oriented x 3	  · Motor	No gross focal deficits - HERNANDEZ spont	  · Skin	detailed exam	  · Skin Details	warm and dry	  · Musculoskeletal	detailed exam	  · Musculoskeletal Details	ROM intact	  · Psychiatric	detailed exam	  · Psychiatric Details	normal affect; normal behavior	      DVT Prophylaxis: SQ heparin, venodynes    Advanced Directives: FULL  Discussed with: patient    Visit Information: SSQ

## 2017-09-08 NOTE — PROVIDER CONTACT NOTE (MEDICATION) - SITUATION
Patient very nervious and wanted to get evaluated by the doctor now as he is very itchy and concerned.

## 2017-09-08 NOTE — PROGRESS NOTE ADULT - SUBJECTIVE AND OBJECTIVE BOX
NEPHROLOGY INTERVAL HPI/OVERNIGHT EVENTS:    9/8  creat cont to increase pt states feels well  does not want catheter placement yet  no urgency for hd, reminded pt cannot get permcath on sunday by IR    9/7 SY  No acute events overnight.  Alert.  No new complaints.    9/6 SY  Alert.  No acute events overnight.    9/5  awake alert no new issues  wants to hold off HD, thinks has adequate renal function to go back to rehab at [place] near his house, off dialysis  d/w Dr Lamont Wheeler    9/4 SY  Alert.  On vent.    No acute events overnight.    9/3 SY  Permcath removed due to MRSA bacteremia.  No acute events.  Alert and responsive on vent/trach.    9/2 SY  S/p HD yesterday.  Now positive blood culture with G + in clusters.  Pt fully alert and responsive.  Understands the need to remove permcath.  Reeducated pt to limit po water intake.      HPI:  This patient is a 67 year old male with PMH significant for:                     Hodgkings Disease treated with Radiation and RT                     CAD - s/p cath in 2009, cabg x 4                     chronic pleural effusion Right chest(s/p VATS 2016)                     aspiration Pneumonia 2016,                      ESRD on Dialysis since 2016                     Rash to vanco, or zoloft on steroid taper                     chronic vent dependence                     hypothyroidism  Yesterday nursing home developed increased HR with cyanosis sat ok  In ED there was high peak pressures, trach may have been positional but wbc has increased to 30, was 14 at rwin..  Admitted for evaluation, needs HD today.  Initially refused, then agreed to 3 hr hd       PAST MEDICAL & SURGICAL HISTORY:  Hypothyroidism  ESRD on hemodialysis  Angina pectoris  Hodgkin's lymphoma  Tracheostomy dependent  Anemia      MEDICATIONS  (STANDING):  metoprolol 12.5 milliGRAM(s) Oral two times a day  levothyroxine 150 MICROGram(s) Oral daily  heparin  Injectable 5000 Unit(s) SubCutaneous every 12 hours  predniSONE   Tablet 50 milliGRAM(s) Oral daily  DAPTOmycin IVPB   IV Intermittent   DAPTOmycin IVPB 460 milliGRAM(s) IV Intermittent every 48 hours  torsemide 40 milliGRAM(s) Oral daily  omeprazole Suspension 40 milliGRAM(s) Oral daily  chlorhexidine 0.12% Liquid 5 milliLiter(s) Swish and Spit two times a day    MEDICATIONS  (PRN):  ALPRAZolam 0.5 milliGRAM(s) Oral at bedtime PRN Sleep  oxyCODONE    5 mG/acetaminophen 325 mG 1 Tablet(s) Oral every 6 hours PRN Moderate Pain (4 - 6)  diphenhydrAMINE   Injectable 25 milliGRAM(s) IV Push every 6 hours PRN Rash and/or Itching        ICU Vital Signs Last 24 Hrs  T(C): 36.5 (08 Sep 2017 05:00), Max: 36.6 (08 Sep 2017 00:00)  T(F): 97.7 (08 Sep 2017 05:00), Max: 97.9 (08 Sep 2017 00:00)  HR: 98 (08 Sep 2017 15:00) (81 - 122)  BP: 106/72 (08 Sep 2017 11:15) (91/58 - 116/58)  BP(mean): 80 (08 Sep 2017 11:15) (75 - 80)  ABP: --  ABP(mean): --  RR: 31 (08 Sep 2017 15:00) (0 - 31)  SpO2: 100% (08 Sep 2017 11:15) (100% - 100%)          PHYSICAL EXAM:  Alert and comfortable  GENERAL: No apparent distress  CHEST/LUNG: Fair air entry   HEART: S1S2 RRR  ABDOMEN: soft  EXTREMITIES: decreased edema  SKIN:     LABS:                          9.7    10.2  )-----------( 382      ( 08 Sep 2017 05:54 )             29.4     09-08    136  |  93<L>  |  93<H>  ----------------------------<  118<H>  4.4   |  33<H>  |  3.40<H>    Ca    8.5      08 Sep 2017 05:54  Phos  3.2     09-08  Mg     2.0     09-08    TPro  x   /  Alb  2.1<L>  /  TBili  x   /  DBili  x   /  AST  x   /  ALT  x   /  AlkPhos  x   09-08            RADIOLOGY & ADDITIONAL TESTS:

## 2017-09-08 NOTE — PROGRESS NOTE ADULT - ASSESSMENT
This patient is a 67 year old male with PMH significant for: Hodgkings Disease treated with Radiation, CAD - s/p cath in 2009, cabg x 4, chronic pleural effusion Right chest, aspiration Pneumonia 2016, ESRD on Dialysis since 2016, Rash to vanco, or zoloft on steroid taper, chronic vent dependence, hypothyroidism..  Yesterday nursing home developed increased HR with cyanosis sat ok  In ED there was high peak pressures, trach may have been positional but wbc has increased to 30, was 14 at [facility]..  Admitted for evaluation, needs HD today.  Initially refused, then agreed to 3 hr hd   Medical management as per ICU  EPO as per outpt orders    9/1 addendum 3:15 pm  seen on hd doing well    9/2 SY  --Pt with APRYL on HD since 5/2017.  Unclear if renal recovery can be expected.   Pt's pre HD creat was only 3.1.  Now found with positive blood culture with gram positive cocci.   Most likely line sepsis.  Will remove catheter today.  Will continue to monitor for any renal recovery.  Limit free water intake to prevent hyponatremia.  Continue diuretics.    9/3 SY  --Permcath removed due to MRSA bacteremia.  Pt on HD since 5/2017 due to ATN /Sepsis.  Unclear if enough renal recovery.  Will follow trend for next 2-3 days to determine residual renal fx.  --Continue abtx for bacteremia.    9/4 SY  --MRSA Bacteremia.  Continue ABTX.  --Creat continues to slowly rise.   Again unclear if pt has enough residula fx to remain off dialysis.  Continue to assess daily.  --If creat continues to rise, then will plan Permcath placement in 2-3 days.     9/5  MRSA bacteremia, on Cubicin  - last hd friday, catheter removed due to MRSA, stable at this time  awake alert no new issues  wants to hold off HD, thinks has adequate renal function to go back to rehab at [place] near his house, off dialysis  d/w Dr Lamont Wheeler, agrees to stay for now    9/6 SY  --APRYL on HD, last TX on 9/1  Renal parameters continue to slowly increase,  therefore, even with adequate urine volume, pt most anjelicaley will need to restart HD   Will monitor for another 1-2 days.  Pt now agreeable with plan for possible permcath placement on 9/8. Friday.  --MRSA bacteremia --continue abtx.    9/7 SY  --APRYL on temporary HD support.  Last tx on 9/1.  Urine output has been adequate.   Unclear if renal recovery may be adequate to remain off dialysis. (  increase in creat may be low due to muscle wasting.)  Will attempt to confirm residual renal fx prior to placing permcath with 24 hour urine collection.  --Fluid and electrolytes stable at this point.  --MRSA bacteremia.  Continue abtx.    9/8  creat cont to increase pt states feels well  elevation in BUN may be due to prednisone as well  does not want catheter placement yet  no urgency for hd, reminded pt he cannot get permcath on sunday by IR

## 2017-09-08 NOTE — PROGRESS NOTE ADULT - SUBJECTIVE AND OBJECTIVE BOX
This patient is a 67 year old male with PMH of Hodgkins disease s/p XRT/chemo, CAD s/p cath 2009, CABG X 4, chronic R pleural effusion s/p VATS 206, asp , ESRD on HD since May 2016, chronic resp failure s/p trach, vent dependent, hypothyroidism admitted 8/31 from NH where he developed tachycardia and cyanosis, was saturating normally, not hypoxic but was sent for further eval, here afebrile, wbc ct elevated to 30, CT chest showed moderate R loculated hydopneumothorax with pleural thickening/ L pleural effusion/pleural thickening, 4 bottles blood cx growing MRSA, + perm catheter in place for past 2 months, has vanco allergy, was given IV dapto/cefepime.                   no fevers  perm catheter removed 9/2  has had rash/dry skin for past few days  occasional pruritus  otherwise feels well    MEDICATIONS  (STANDING):  metoprolol 12.5 milliGRAM(s) Oral two times a day  levothyroxine 150 MICROGram(s) Oral daily  heparin  Injectable 5000 Unit(s) SubCutaneous every 12 hours  predniSONE   Tablet 50 milliGRAM(s) Oral daily  DAPTOmycin IVPB   IV Intermittent   DAPTOmycin IVPB 460 milliGRAM(s) IV Intermittent every 48 hours  torsemide 40 milliGRAM(s) Oral daily  omeprazole Suspension 40 milliGRAM(s) Oral daily  chlorhexidine 0.12% Liquid 5 milliLiter(s) Swish and Spit two times a day      Vital Signs Last 24 Hrs  T(C): 36.5 (08 Sep 2017 05:00), Max: 36.6 (08 Sep 2017 00:00)  T(F): 97.7 (08 Sep 2017 05:00), Max: 97.9 (08 Sep 2017 00:00)  HR: 103 (08 Sep 2017 08:00) (81 - 114)  BP: 99/62 (08 Sep 2017 07:15) (91/58 - 116/58)  BP(mean): 75 (08 Sep 2017 07:00) (74 - 78)  RR: 23 (08 Sep 2017 08:00) (0 - 30)  SpO2: 100% (08 Sep 2017 07:48) (100% - 100%)          PE:  Constitutional: frail looking, + trach   HEENT: NC/AT, EOMI, PERRLA  Neck: supple  Back: no tenderness  Respiratory: decreased breath sounds  Cardiovascular: S1S2 regular, no murmurs  Abdomen: soft, not tender, + gtube in place with some drainage  Genitourinary: deferred  Rectal: deferred  Musculoskeletal: no muscle tenderness, no joint swelling or tenderness  Extremities: no pedal edema  Neurological:  no focal deficits  Skin: erythematous rash on chest/trunk/worse along LE's blanching    Labs:                                   9.7    10.2  )-----------( 382      ( 08 Sep 2017 05:54 )             29.4     09-08    136  |  93<L>  |  93<H>  ----------------------------<  118<H>  4.4   |  33<H>  |  3.40<H>    Ca    8.5      08 Sep 2017 05:54  Phos  3.2     09-08  Mg     2.0     09-08    TPro  x   /  Alb  2.1<L>  /  TBili  x   /  DBili  x   /  AST  x   /  ALT  x   /  AlkPhos  x   09-08                    Cultures:         Culture - Blood in AM (09.03.17 @ 05:56)    Specimen Source: .Blood None    Culture Results:   No growth to date.    Culture - Blood in AM (09.03.17 @ 06:00)    Specimen Source: .Blood None    Culture Results:   No growth to date.      Culture - Blood (08.31.17 @ 17:29)    -  Methicillin resistant Staphylococcus aureus (MRSA): Detec    -  Candida parapsilosis: Nondet    -  Coagulase negative Staphylococcus: Nondet    -  Enterobacter cloacae complex: Nondet    -  Enterococcus species: Nondet    -  Escherichia coli: Nondet    -  Haemophilus influenzae: Nondet    -  Neisseria meningitidis: Nondet    -  Proteus species: Nondet    -  Pseudomonas aeruginosa: Nondet    -  Streptococcus agalactiae (Group B): Nondet    -  Acinetobacter baumanii: Nondet    -  Candida albicans: Nondet    -  Serratia marcescens: Nondet    -  Streptococcus pneumoniae: Nondet    -  Vancomycin resistant Enterococcus sp.: Nondet    Gram Stain:   Growth in aerobic bottle: Gram Positive Cocci in Clusters  Growth in anaerobic bottle: Gram Positive Cocci in Clusters    -  Candida glabrata: Nondet    -  Candida krusei: Nondet    -  Candida tropicalis: Nondet    -  Klebsiella oxytoca: Nondet    -  Klebsiella pneumoniae: Nondet    -  Listeria monocytogenes: Nondet    -  Multidrug (KPC pos) resistant organism: Nondet    -  Staphylococcus aureus: Nondet    -  Streptococcus pyogenes (Group A): Nondet    -  Streptococcus sp. (Not Grp A, B or S pneumoniae): Nondet    Specimen Source: .Blood Blood    Organism: Blood Culture PCR    Culture Results:   Growth in aerobic and anaerobic bottles: Staphylococcus aureus  ***Blood Panel PCR results on this specimen are available  approximately 3 hours after the Gram stain result.***  Gram stain, PCR, and/or culture results may not always  correspond dueto difference in methodologies.    Organism Identification: Blood Culture PCR    Method Type: PCR    Culture - Sputum . (09.01.17 @ 12:45)    -  Ampicillin/Sulbactam: R <=8/4    -  Ciprofloxacin: R >2    -  Erythromycin: R >4    -  Penicillin: R 8    Gram Stain:   No squamous epithelial cells per low power field  No polymorphonuclear cells seen per low power field  Few Gram Negative Rods per oil power field  Few Gram Positive Cocci in Clusters per oil power field    -  Gentamicin: S <=1    -  Levofloxacin: S 2    -  Tetra/Doxy: S <=1    -  Ceftazidime: S 4    -  Clindamycin: R >4    -  Oxacillin: R >2    -  RIF- Rifampin: S <=1    -  Trimethoprim/Sulfamethoxazole: S <=0.5/9.5    -  Trimethoprim/Sulfamethoxazole: S <=0.5/9.5    -  Cefazolin: R >16    -  Levofloxacin: R >4    -  Linezolid: S 2    -  Moxifloxacin(Aerobic): R >4    -  Vancomycin: S 2    Specimen Source: .Sputum Sputum    Culture Results:   Moderate Methicillin resistant Staphylococcus aureus  Numerous Stenotrophomonas maltophilia  Normal Respiratory Ekaterina absent    Organism Identification: Methicillin resistant Staphylococcus aureus  Stenotrophomonas maltophilia    Organism: Methicillin resistant Staphylococcus aureus    Organism: Stenotrophomonas maltophilia    Method Type: AWILDA    Method Type: AWILDA            Radiology:  TTE no obvious vegetations     < from: CT Chest No Cont (08.31.17 @ 17:58) >  EXAM:  CT CHEST                            PROCEDURE DATE:  08/31/2017          INTERPRETATION:  CT CHEST    HISTORY:  respiratory failure                     TECHNIQUE: Noncontrast CT of the chest was performed. Coronal and   sagittal images were reconstructed. This study was performed using   automatic exposure control (radiation dose reduction software) to obtain   a diagnostic image quality scan with patient dose as low as reasonably   achievable.    COMPARISON: Chest x-ray from the same day    FINDINGS:    LUNGS, AIRWAYS: Tracheostomy in place. The central airways are patent.   Mild interstitial and alveolar pulmonary edema. Bibasilar compressive   atelectasis with near complete collapse of both lower lobes.    PLEURA: Moderate loculated right hydropneumothorax pleural thickening.   Moderate layering left pleural effusion.    HEART AND VESSELS: Status post CABG. Right IJ dialysis catheter tip in   the right atrium. Normal heart size. No pericardial effusion. Extensive   aortic valve and coronary calcification. Atherosclerotic thoracic aorta   is normal in caliber.    MEDIASTINUM AND VIKI: No adenopathy.    UPPER ABDOMEN: Small ascites. Gallstones. Percutaneous gastrostomy tube   in place. Splenectomy.    BONES AND SOFT TISSUES:Status post sternotomy. No acute bony abnormality.    IMPRESSION:     Mild pulmonary interstitial and alveolar edema.    Near-complete collapse of both lower lobes secondary to compression   atelectasis.    Moderate loculated right hydropneumothorax with associated pleural   thickening. Moderate layering left pleural effusion with associated   pleural thickening.      < end of copied text >      Advanced directives addressed: full resuscitation

## 2017-09-09 LAB
ANION GAP SERPL CALC-SCNC: 11 MMOL/L — SIGNIFICANT CHANGE UP (ref 5–17)
BODY SURFACE AREA CALCULATION: 1.73 M2 — SIGNIFICANT CHANGE UP
BUN SERPL-MCNC: 97 MG/DL — HIGH (ref 7–23)
CALCIUM SERPL-MCNC: 9 MG/DL — SIGNIFICANT CHANGE UP (ref 8.5–10.1)
CHLORIDE SERPL-SCNC: 93 MMOL/L — LOW (ref 96–108)
CK SERPL-CCNC: 14 U/L — LOW (ref 26–308)
CO2 SERPL-SCNC: 32 MMOL/L — HIGH (ref 22–31)
COLLECT DURATION TIME UR: 24 HR — SIGNIFICANT CHANGE UP
COLLECT DURATION TIME UR: 24 HR — SIGNIFICANT CHANGE UP
CREAT CL ?TM UR+SERPL-VRATE: 6 ML/MIN — LOW (ref 85–125)
CREAT SERPL-MCNC: 3.47 MG/DL — HIGH (ref 0.5–1.3)
GLUCOSE SERPL-MCNC: 100 MG/DL — HIGH (ref 70–99)
MAGNESIUM SERPL-MCNC: 2.1 MG/DL — SIGNIFICANT CHANGE UP (ref 1.6–2.6)
PHOSPHATE SERPL-MCNC: 3.3 MG/DL — SIGNIFICANT CHANGE UP (ref 2.5–4.5)
POTASSIUM SERPL-MCNC: 4.5 MMOL/L — SIGNIFICANT CHANGE UP (ref 3.5–5.3)
POTASSIUM SERPL-SCNC: 4.5 MMOL/L — SIGNIFICANT CHANGE UP (ref 3.5–5.3)
SODIUM SERPL-SCNC: 136 MMOL/L — SIGNIFICANT CHANGE UP (ref 135–145)
TOTAL VOLUME - 24 HOUR: 1175 ML — SIGNIFICANT CHANGE UP
TOTAL VOLUME - 24 HOUR: 1175 ML — SIGNIFICANT CHANGE UP
URINE CREATININE CALCULATION: 0.3 G/24 H — LOW (ref 1–2)
URINE CREATININE CALCULATION: 0.3 G/24 H — LOW (ref 1–2)

## 2017-09-09 RX ORDER — ALPRAZOLAM 0.25 MG
0.5 TABLET ORAL AT BEDTIME
Qty: 0 | Refills: 0 | Status: DISCONTINUED | OUTPATIENT
Start: 2017-09-09 | End: 2017-09-15

## 2017-09-09 RX ADMIN — OMEPRAZOLE 40 MILLIGRAM(S): 10 CAPSULE, DELAYED RELEASE ORAL at 15:46

## 2017-09-09 RX ADMIN — Medication 12.5 MILLIGRAM(S): at 11:23

## 2017-09-09 RX ADMIN — CHLORHEXIDINE GLUCONATE 5 MILLILITER(S): 213 SOLUTION TOPICAL at 11:21

## 2017-09-09 RX ADMIN — Medication 12.5 MILLIGRAM(S): at 18:22

## 2017-09-09 RX ADMIN — OXYCODONE AND ACETAMINOPHEN 1 TABLET(S): 5; 325 TABLET ORAL at 22:46

## 2017-09-09 RX ADMIN — Medication 0.5 MILLIGRAM(S): at 22:46

## 2017-09-09 RX ADMIN — CHLORHEXIDINE GLUCONATE 5 MILLILITER(S): 213 SOLUTION TOPICAL at 18:21

## 2017-09-09 RX ADMIN — Medication 12.5 MILLIGRAM(S): at 18:32

## 2017-09-09 RX ADMIN — Medication 50 MILLIGRAM(S): at 11:28

## 2017-09-09 RX ADMIN — DAPTOMYCIN 118.4 MILLIGRAM(S): 500 INJECTION, POWDER, LYOPHILIZED, FOR SOLUTION INTRAVENOUS at 18:37

## 2017-09-09 RX ADMIN — Medication 40 MILLIGRAM(S): at 11:29

## 2017-09-09 RX ADMIN — HEPARIN SODIUM 5000 UNIT(S): 5000 INJECTION INTRAVENOUS; SUBCUTANEOUS at 11:22

## 2017-09-09 RX ADMIN — Medication 5 MILLILITER(S): at 15:47

## 2017-09-09 RX ADMIN — Medication 150 MICROGRAM(S): at 11:23

## 2017-09-09 NOTE — PROGRESS NOTE ADULT - ASSESSMENT
66yo M suffering from MRSA sepsis and bacteremia - tunnelled HD cath thought to be source, though HCAP is other possible source.  acute on chronic respiratory failure due to difficulty ventilating - improved with tracheostomy tube manipulation  Chronic right pleural effusion, patient was advised in past not to have intervention  Tunnelled HD cath removed 9/2.  No acute need for HD today  remains chronically vent dependent though is tolerating periods of PSV 10/5 40%  hemodynamics reasonable  DRESS syndrome   ? hx of diastolic chf - pt reports RIGHT sided pericardial constriction as cause - Not on ACE or ARB due to poor renal function which required initiation of HD about 3 months ago and now borderline renal function which may or may not require continued HD.  EF 60%  'cleared' by speech pathology for clear liquid intake by mouth    Plan at this time is for continued care in CCU  HOB 30  GI and DVT prophylaxis as appropriate  monitor cultures  ABx  Mobilize, PT  TF's + clear liquids TID  continue steroid taper  patient may need new HD cath by IR or Vasc sx if HD again indicated - today is day 7 without indwelling catheter - making reasonable urine and no lyte or acid base issues this far - will monitor renal function and amend plan accordingly.  supportive care    Discussed with patient in detail and all questions answered - pt is optimistic that he will no longer need HD and then can be transfer to a facility closer to [facility] where his doctors are and where he is closer to his home.

## 2017-09-09 NOTE — PROGRESS NOTE ADULT - SUBJECTIVE AND OBJECTIVE BOX
NEPHROLOGY INTERVAL HPI/OVERNIGHT EVENTS:  no new complaints.    doing well.    uop 300 ml    HPI:  This patient is a 67 year old male with PMH significant for:                     Hodgkings Disease treated with Radiation and RT                     CAD - s/p cath in 2009, cabg x 4                     chronic pleural effusion Right chest(s/p VATS 2016)                     aspiration Pneumonia 2016,                      ESRD on Dialysis since 2016                     Rash to vanco, or zoloft on steroid taper                     chronic vent dependence                     hypothyroidism  Today in nursing home developed increased HR with cyanosis sat ok  In ED there was high peak pressures, trach may have been positional but wbc has increased to 30, was 14 at [facility] (31 Aug 2017 16:24)      Patient is a 66y old  Male who presents with a chief complaint of sob, difficulty ventilating (31 Aug 2017 16:24)      MEDICATIONS  (STANDING):  metoprolol 12.5 milliGRAM(s) Oral two times a day  levothyroxine 150 MICROGram(s) Oral daily  heparin  Injectable 5000 Unit(s) SubCutaneous every 12 hours  predniSONE   Tablet 50 milliGRAM(s) Oral daily  DAPTOmycin IVPB 460 milliGRAM(s) IV Intermittent every 48 hours  DAPTOmycin IVPB   IV Intermittent   torsemide 40 milliGRAM(s) Oral daily  omeprazole Suspension 40 milliGRAM(s) Oral daily  chlorhexidine 0.12% Liquid 5 milliLiter(s) Swish and Spit two times a day  multivitamin 1 Tablet(s) Oral daily    MEDICATIONS  (PRN):  oxyCODONE    5 mG/acetaminophen 325 mG 1 Tablet(s) Oral every 6 hours PRN Moderate Pain (4 - 6)  diphenhydrAMINE   Injectable 25 milliGRAM(s) IV Push every 6 hours PRN Rash and/or Itching      Allergies    epinephrine (Unknown)  fentanyl (Unknown)  Reglan (Unknown)  Vancomycin Hydrochloride (Rash)  Zoloft (Unknown)    Intolerances        I&O's Detail    08 Sep 2017 07:01  -  09 Sep 2017 07:00  --------------------------------------------------------  IN:    Enteral Tube Flush: 240 mL    Oral Fluid: 960 mL    Vital HN: 750 mL  Total IN: 1950 mL    OUT:    PEG (Percutaneous Endoscopic Gastrostomy) Tube: 1625 mL    Voided: 300 mL  Total OUT: 1925 mL    Total NET: 25 mL      Vital Signs Last 24 Hrs  T(C): 35.6 (09 Sep 2017 08:00), Max: 35.6 (09 Sep 2017 08:00)  T(F): 96 (09 Sep 2017 08:00), Max: 96 (09 Sep 2017 08:00)  HR: 89 (08 Sep 2017 20:27) (89 - 107)  BP: 124/78 (08 Sep 2017 16:46) (124/78 - 124/78)  BP(mean): 88 (08 Sep 2017 16:46) (88 - 88)  RR: 21 (08 Sep 2017 18:00) (4 - 31)  SpO2: 100% (08 Sep 2017 20:27) (96% - 100%)  Daily     Daily     PHYSICAL EXAM:  General: alert. awake Ox3  HEENT: MMM  CV: s1s2 rrr  LUNGS: B/L CTA  EXT: no edema    LABS:                        9.7    10.2  )-----------( 382      ( 08 Sep 2017 05:54 )             29.4     09-09    136  |  93<L>  |  97<H>  ----------------------------<  100<H>  4.5   |  32<H>  |  3.47<H>    Ca    9.0      09 Sep 2017 05:32  Phos  3.3     09-09  Mg     2.1     09-09    TPro  x   /  Alb  2.1<L>  /  TBili  x   /  DBili  x   /  AST  x   /  ALT  x   /  AlkPhos  x   09-08        Magnesium, Serum: 2.1 mg/dL (09-09 @ 05:32)  Phosphorus Level, Serum: 3.3 mg/dL (09-09 @ 05:32)

## 2017-09-09 NOTE — PROGRESS NOTE ADULT - SUBJECTIVE AND OBJECTIVE BOX
67 year old male with PMH significant for:                     Hodgkings Disease treated with Radiation and RT                     CAD - s/p cath in 2009, cabg x 4                     chronic pleural effusion Right chest(s/p VATS 2016)                     aspiration Pneumonia 2016,                      ESRD on Dialysis since 2016                     Rash to vanco, or zoloft on steroid taper                     chronic vent dependence                     hypothyroidism  In nursing home pt developed increased HR with cyanosis - O2 Sat was ok  In ED pt with high peak pressures, trach may have been positional but WBC noted to be 30k whereas it was 14k at [facility]win.    Sputum and blood cultures (+) for MRSA  Pts tunnelled HD cath was removed when bacteremia identified as thought to be potential source  subsequent cultures have been NGTD.    ECHO EF 60% - No evidence of vegetation.    above d/w Dr Covarrubias who has been caring for the patient.    9/5: this morning pt refusing most interventions and is agitated and uncooperative with nursing staff, currently he is calm and cooperative - issues and plans discussed in detail with him and all questions answered - he wants to get OOB and ambulate if possible.    9/7: d/w Dr Covarrubias who cared for patient yesterday.  No new issues - no overnight events.  Hemodynamics reasonable.  Creatinine rising but no acute indications for HD.    9/8: question of 'flare up' of pts rash - overnight  - he refused benadryl.  No new issues - hemodynamics reasonable.  Passed swallow eval for liquids yesterday.    9/9:  No overnight events - no new issues.  Hemodynamics reasonable - tolerated 5hrs PSV yesterday.  OOB to chair daily and working with PT.  No acute indications for HD at this time.    Allergies    epinephrine (Unknown)  fentanyl (Unknown)  Reglan (Unknown)  Vancomycin Hydrochloride (Rash)  Zoloft (Unknown)      ICU Vital Signs Last 24 Hrs  HR: 89 (08 Sep 2017 20:27) (85 - 108)  BP: 124/78 (08 Sep 2017 16:46) (106/72 - 124/78)  BP(mean): 88 (08 Sep 2017 16:46) (80 - 88)  RR: 21 (08 Sep 2017 18:00) (4 - 31)  SpO2: 100% (08 Sep 2017 20:27) (96% - 100%)      Mode: AC/ CMV (Assist Control/ Continuous Mandatory Ventilation)  RR (machine): 14  TV (machine): 400  FiO2: 40  PEEP: 5  ITime: 1  MAP: 10  PIP: 30      I&O's Summary    08 Sep 2017 07:01  -  09 Sep 2017 07:00  --------------------------------------------------------  IN: 1950 mL / OUT: 1925 mL / NET: 25 mL                              9.7    10.2  )-----------( 382      ( 08 Sep 2017 05:54 )             29.4       09-09    136  |  93<L>  |  97<H>  ----------------------------<  100<H>  4.5   |  32<H>  |  3.47<H>    Ca    9.0      09 Sep 2017 05:32  Phos  3.3     09-09  Mg     2.1     09-09    TPro  x   /  Alb  2.1<L>  /  TBili  x   /  DBili  x   /  AST  x   /  ALT  x   /  AlkPhos  x   09-08      CAPILLARY BLOOD GLUCOSE          LIVER FUNCTIONS - ( 08 Sep 2017 05:54 )  Alb: 2.1 g/dL / Pro: x     / ALK PHOS: x     / ALT: x     / AST: x     / GGT: x             CARDIAC MARKERS ( 09 Sep 2017 05:32 )  x     / x     / 14 U/L / x     / x          MEDICATIONS  (STANDING):  metoprolol 12.5 milliGRAM(s) Oral two times a day  levothyroxine 150 MICROGram(s) Oral daily  heparin  Injectable 5000 Unit(s) SubCutaneous every 12 hours  predniSONE   Tablet 50 milliGRAM(s) Oral daily  DAPTOmycin IVPB 460 milliGRAM(s) IV Intermittent every 48 hours  DAPTOmycin IVPB   IV Intermittent   torsemide 40 milliGRAM(s) Oral daily  omeprazole Suspension 40 milliGRAM(s) Oral daily  chlorhexidine 0.12% Liquid 5 milliLiter(s) Swish and Spit two times a day  multivitamin 1 Tablet(s) Oral daily    MEDICATIONS  (PRN):  oxyCODONE    5 mG/acetaminophen 325 mG 1 Tablet(s) Oral every 6 hours PRN Moderate Pain (4 - 6)  diphenhydrAMINE   Injectable 25 milliGRAM(s) IV Push every 6 hours PRN Rash and/or Itching    Review of Systems:   · Negative General Symptoms	no fever; no chills	  · Negative Skin Symptoms	no itching	  · Negative Ophthalmologic Symptoms	no diplopia; no photophobia	  · Negative ENMT Symptoms	no dry mouth; no dysphagia	  · Negative Respiratory and Thorax Symptoms	no wheezing; no dyspnea	  · Negative Cardiovascular Symptoms	no chest pain; no palpitations	  · Negative Gastrointestinal Symptoms	no nausea; no vomiting; no diarrhea	  · Negative General Genitourinary Symptoms	no dysuria	  · Negative Musculoskeletal Symptoms	generalized muscle weakness and debility	  · Negative Neurological Symptoms	no syncope; no headache	  · Negative Psychiatric Symptoms	no suicidal ideation; no anxiety	  · Additional ROS	ALL other ROS are negative.	    Physical Exam:   · Constitutional	detailed exam	  · Constitutional Details	no distress	  · Eyes	detailed exam	  · Eyes Details	PERRL; EOMI	  · ENMT	detailed exam	  · ENMT Details	mouth	  · Mouth	moist	  · ENMT Comments	trach in situ	  · Neck	detailed exam	  · Neck Details	supple; no JVD	  · Back	detailed exam	  · Back Details	normal shape	  · Respiratory	detailed exam	  · Respiratory Details	airway patent; breath sounds equal; good air movement; respirations non-labored	  · Cardiovascular	detailed exam	  · Cardiovascular Details	regular rate and rhythm 	  · Gastrointestinal	detailed exam	  · GI Normal	soft; nontender; no distention; bowel sounds normal; PEG in situ	  · Extremities	detailed exam	  · Extremities Details	no clubbing; no cyanosis	  · Vascular	Equal and normal pulses (carotid, femoral, dorsalis pedis)	  · Neurological	detailed exam	  · Neurological Details	alert and oriented x 3	  · Motor	No gross focal deficits - HERNANDEZ spont	  · Skin	detailed exam	  · Skin Details	warm and dry	  · Musculoskeletal	detailed exam	  · Musculoskeletal Details	ROM intact	  · Psychiatric	detailed exam	  · Psychiatric Details	normal affect; normal behavior	      DVT Prophylaxis: SQ heparin, venodynes    Advanced Directives: FULL  Discussed with: patient    Visit Information: SSQ

## 2017-09-10 LAB
ALBUMIN SERPL ELPH-MCNC: 2 G/DL — LOW (ref 3.3–5)
ANION GAP SERPL CALC-SCNC: 10 MMOL/L — SIGNIFICANT CHANGE UP (ref 5–17)
BUN SERPL-MCNC: 103 MG/DL — HIGH (ref 7–23)
CALCIUM SERPL-MCNC: 8.7 MG/DL — SIGNIFICANT CHANGE UP (ref 8.5–10.1)
CHLORIDE SERPL-SCNC: 92 MMOL/L — LOW (ref 96–108)
CO2 SERPL-SCNC: 34 MMOL/L — HIGH (ref 22–31)
CREAT SERPL-MCNC: 3.58 MG/DL — HIGH (ref 0.5–1.3)
GLUCOSE SERPL-MCNC: 116 MG/DL — HIGH (ref 70–99)
HCT VFR BLD CALC: 28.7 % — LOW (ref 39–50)
HGB BLD-MCNC: 9.4 G/DL — LOW (ref 13–17)
MAGNESIUM SERPL-MCNC: 2 MG/DL — SIGNIFICANT CHANGE UP (ref 1.6–2.6)
MCHC RBC-ENTMCNC: 32.9 GM/DL — SIGNIFICANT CHANGE UP (ref 32–36)
MCHC RBC-ENTMCNC: 33.4 PG — SIGNIFICANT CHANGE UP (ref 27–34)
MCV RBC AUTO: 101.7 FL — HIGH (ref 80–100)
PHOSPHATE SERPL-MCNC: 3.6 MG/DL — SIGNIFICANT CHANGE UP (ref 2.5–4.5)
PLATELET # BLD AUTO: 372 K/UL — SIGNIFICANT CHANGE UP (ref 150–400)
POTASSIUM SERPL-MCNC: 4.5 MMOL/L — SIGNIFICANT CHANGE UP (ref 3.5–5.3)
POTASSIUM SERPL-SCNC: 4.5 MMOL/L — SIGNIFICANT CHANGE UP (ref 3.5–5.3)
RBC # BLD: 2.82 M/UL — LOW (ref 4.2–5.8)
RBC # FLD: 18 % — HIGH (ref 10.3–14.5)
SODIUM SERPL-SCNC: 136 MMOL/L — SIGNIFICANT CHANGE UP (ref 135–145)
WBC # BLD: 13.5 K/UL — HIGH (ref 3.8–10.5)
WBC # FLD AUTO: 13.5 K/UL — HIGH (ref 3.8–10.5)

## 2017-09-10 RX ORDER — OXYCODONE AND ACETAMINOPHEN 5; 325 MG/1; MG/1
1 TABLET ORAL EVERY 6 HOURS
Qty: 0 | Refills: 0 | Status: DISCONTINUED | OUTPATIENT
Start: 2017-09-10 | End: 2017-09-15

## 2017-09-10 RX ADMIN — CHLORHEXIDINE GLUCONATE 5 MILLILITER(S): 213 SOLUTION TOPICAL at 06:06

## 2017-09-10 RX ADMIN — Medication 12.5 MILLIGRAM(S): at 17:52

## 2017-09-10 RX ADMIN — Medication 12.5 MILLIGRAM(S): at 06:06

## 2017-09-10 RX ADMIN — Medication 5 MILLILITER(S): at 11:03

## 2017-09-10 RX ADMIN — Medication 40 MILLIGRAM(S): at 06:06

## 2017-09-10 RX ADMIN — OXYCODONE AND ACETAMINOPHEN 1 TABLET(S): 5; 325 TABLET ORAL at 23:32

## 2017-09-10 RX ADMIN — Medication 0.5 MILLIGRAM(S): at 23:32

## 2017-09-10 RX ADMIN — Medication 150 MICROGRAM(S): at 06:07

## 2017-09-10 RX ADMIN — OMEPRAZOLE 40 MILLIGRAM(S): 10 CAPSULE, DELAYED RELEASE ORAL at 11:03

## 2017-09-10 RX ADMIN — Medication 40 MILLIGRAM(S): at 17:53

## 2017-09-10 RX ADMIN — Medication 50 MILLIGRAM(S): at 06:07

## 2017-09-10 RX ADMIN — CHLORHEXIDINE GLUCONATE 5 MILLILITER(S): 213 SOLUTION TOPICAL at 17:51

## 2017-09-10 NOTE — PROGRESS NOTE ADULT - ASSESSMENT
This patient is a 67 year old male with PMH significant for: Hodgkings Disease treated with Radiation, CAD - s/p cath in 2009, cabg x 4, chronic pleural effusion Right chest, aspiration Pneumonia 2016, ESRD on Dialysis since 2016, Rash to vanco, or zoloft on steroid taper, chronic vent dependence, hypothyroidism..  Yesterday nursing home developed increased HR with cyanosis sat ok  In ED there was high peak pressures, trach may have been positional but wbc has increased to 30, was 14 at [facility]..  Admitted for evaluation, needs HD today.  Initially refused, then agreed to 3 hr hd   Medical management as per ICU  EPO as per outpt orders    9/1 addendum 3:15 pm  seen on hd doing well    9/2 SY  --Pt with APRYL on HD since 5/2017.  Unclear if renal recovery can be expected.   Pt's pre HD creat was only 3.1.  Now found with positive blood culture with gram positive cocci.   Most likely line sepsis.  Will remove catheter today.  Will continue to monitor for any renal recovery.  Limit free water intake to prevent hyponatremia.  Continue diuretics.    9/3 SY  --Permcath removed due to MRSA bacteremia.  Pt on HD since 5/2017 due to ATN /Sepsis.  Unclear if enough renal recovery.  Will follow trend for next 2-3 days to determine residual renal fx.  --Continue abtx for bacteremia.    9/4 SY  --MRSA Bacteremia.  Continue ABTX.  --Creat continues to slowly rise.   Again unclear if pt has enough residula fx to remain off dialysis.  Continue to assess daily.  --If creat continues to rise, then will plan Permcath placement in 2-3 days.     9/5  MRSA bacteremia, on Cubicin  - last hd friday, catheter removed due to MRSA, stable at this time  awake alert no new issues  wants to hold off HD, thinks has adequate renal function to go back to rehab at [facility] near his house, off dialysis  d/w Dr Lamont Abdullahi, agrees to stay for now    9/6 SY  --APRYL on HD, last TX on 9/1  Renal parameters continue to slowly increase,  therefore, even with adequate urine volume, pt most anjelicaley will need to restart HD   Will monitor for another 1-2 days.  Pt now agreeable with plan for possible permcath placement on 9/8. Friday.  --MRSA bacteremia --continue abtx.    9/7 SY  --APRYL on temporary HD support.  Last tx on 9/1.  Urine output has been adequate.   Unclear if renal recovery may be adequate to remain off dialysis. (  increase in creat may be low due to muscle wasting.)  Will attempt to confirm residual renal fx prior to placing permcath with 24 hour urine collection.  --Fluid and electrolytes stable at this point.  --MRSA bacteremia.  Continue abtx.    9/8  creat cont to increase pt states feels well  elevation in BUN may be due to prednisone as well  does not want catheter placement yet  no urgency for hd, reminded pt he cannot get permcath on sunday by IR    9/9 MK  - APRYL with slowly rising cr and the BUN elevation confounded with prednisone administration   - electrolytes stable  - continue to monitor the renal function over the weekend and will likely need IR placement of catheter, pt aware of plan.    accepting but diasppointed    9/10 MK  - APRYL with continued rising scr and BUn elevation confounded by prednisone administration  - stable renal paramenter, will continue to monitor since with UOP improving, INC torsemide  - pt aware that will assess daily for the placement of HD catheter.  - leukocytosis: likely steroid induced, will fu and monitor  dw dr avani abdullahi

## 2017-09-10 NOTE — PROGRESS NOTE ADULT - ASSESSMENT
66yo M suffering from MRSA sepsis and bacteremia - tunnelled HD cath thought to be source, though HCAP is other possible source.  acute on chronic respiratory failure due to difficulty ventilating - improved with tracheostomy tube manipulation  Chronic right pleural effusion, patient was advised in past not to have intervention  Tunnelled HD cath removed 9/2.  No acute need for HD today  remains chronically vent dependent though is tolerating periods of PSV 10/5 40%  hemodynamics reasonable  DRESS syndrome   ? hx of diastolic chf - pt reports RIGHT sided pericardial constriction as cause - Not on ACE or ARB due to poor renal function which required initiation of HD about 3 months ago and now borderline renal function which may or may not require continued HD.  EF 60%  'cleared' by speech pathology for clear liquid intake by mouth    Plan at this time is for continued care in CCU  HOB 30  GI and DVT prophylaxis as appropriate  monitor cultures  ABx  Mobilize, PT  TF's + clear liquids TID  continue steroid taper (long taper as noted in NH records for interstitial nephritis and DRESS syndrome - dose drops every 28 days)  patient may need new HD cath by IR or Vasc sx if HD again indicated - today is day 8 without indwelling catheter - making reasonable urine and no lyte or acid base issues this far - will monitor renal function and amend plan accordingly.  supportive care    Discussed with patient in detail and all questions answered - pt is optimistic that he will no longer need HD and then can be transfer to a facility closer to [facility] where his doctors are and where he is closer to his home.

## 2017-09-10 NOTE — PROGRESS NOTE ADULT - SUBJECTIVE AND OBJECTIVE BOX
67 year old male with PMH significant for:                     Hodgkings Disease treated with Radiation and RT                     CAD - s/p cath in 2009, cabg x 4                     chronic pleural effusion Right chest(s/p VATS 2016)                     aspiration Pneumonia 2016,                      ESRD on Dialysis since 2016                     Rash to vanco, or zoloft on steroid taper                     chronic vent dependence                     hypothyroidism  In nursing home pt developed increased HR with cyanosis - O2 Sat was ok  In ED pt with high peak pressures, trach may have been positional but WBC noted to be 30k whereas it was 14k at [facility]win.    Sputum and blood cultures (+) for MRSA  Pts tunnelled HD cath was removed when bacteremia identified as thought to be potential source  subsequent cultures have been NGTD.    ECHO EF 60% - No evidence of vegetation.    above d/w Dr Covarrubias who has been caring for the patient.    9/5: this morning pt refusing most interventions and is agitated and uncooperative with nursing staff, currently he is calm and cooperative - issues and plans discussed in detail with him and all questions answered - he wants to get OOB and ambulate if possible.    9/7: d/w Dr Covarrubias who cared for patient yesterday.  No new issues - no overnight events.  Hemodynamics reasonable.  Creatinine rising but no acute indications for HD.    9/8: question of 'flare up' of pts rash - overnight  - he refused benadryl.  No new issues - hemodynamics reasonable.  Passed swallow eval for liquids yesterday.    9/9:  No overnight events - no new issues.  Hemodynamics reasonable - tolerated 5 hrs PSV yesterday.  OOB to chair daily and working with PT.  No acute indications for HD at this time.    9/10: No overnight events - no new issues.  Hemodynamics reasonable - tolerated 4 hrs PSV today.  No acute indications for HD at this time.  D/w nephrology and will continue to monitor renal function and urine output.  No definitive plan for HD at this time.        Allergies    epinephrine (Unknown)  fentanyl (Unknown)  Reglan (Unknown)  Vancomycin Hydrochloride (Rash)  Zoloft (Unknown)      ICU Vital Signs Last 24 Hrs  T(C): 36 (10 Sep 2017 08:19), Max: 36.6 (09 Sep 2017 23:24)  T(F): 96.8 (10 Sep 2017 08:19), Max: 97.8 (09 Sep 2017 23:24)  HR: 109 (10 Sep 2017 13:30) (81 - 113)  BP: 111/65 (10 Sep 2017 13:30) (100/64 - 111/65)  BP(mean): 76 (10 Sep 2017 13:30) (76 - 76)  ABP: --  ABP(mean): --  RR: 22 (10 Sep 2017 13:30) (0 - 29)  SpO2: 100% (10 Sep 2017 13:30) (98% - 100%)      Mode: CPAP with PS  FiO2: 40  PEEP: 5  PS: 10      I&O's Summary    09 Sep 2017 07:01  -  10 Sep 2017 07:00  --------------------------------------------------------  IN: 1450 mL / OUT: 1700 mL / NET: -250 mL    10 Sep 2017 07:01  -  10 Sep 2017 15:49  --------------------------------------------------------  IN: 240 mL / OUT: 700 mL / NET: -460 mL                              9.4    13.5  )-----------( 372      ( 10 Sep 2017 05:14 )             28.7       09-10    136  |  92<L>  |  103<H>  ----------------------------<  116<H>  4.5   |  34<H>  |  3.58<H>    Ca    8.7      10 Sep 2017 05:14  Phos  3.6     09-10  Mg     2.0     09-10    TPro  x   /  Alb  2.0<L>  /  TBili  x   /  DBili  x   /  AST  x   /  ALT  x   /  AlkPhos  x   09-10    LIVER FUNCTIONS - ( 10 Sep 2017 05:14 )  Alb: 2.0 g/dL / Pro: x     / ALK PHOS: x     / ALT: x     / AST: x     / GGT: x             CARDIAC MARKERS ( 09 Sep 2017 05:32 )  x     / x     / 14 U/L / x     / x          MEDICATIONS  (STANDING):  metoprolol 12.5 milliGRAM(s) Oral two times a day  levothyroxine 150 MICROGram(s) Oral daily  heparin  Injectable 5000 Unit(s) SubCutaneous every 12 hours  predniSONE   Tablet 50 milliGRAM(s) Oral daily  DAPTOmycin IVPB 460 milliGRAM(s) IV Intermittent every 48 hours  DAPTOmycin IVPB   IV Intermittent   torsemide 40 milliGRAM(s) Oral daily  omeprazole Suspension 40 milliGRAM(s) Oral daily  chlorhexidine 0.12% Liquid 5 milliLiter(s) Swish and Spit two times a day  multivitamin   Solution 5 milliLiter(s) Oral daily    MEDICATIONS  (PRN):  diphenhydrAMINE   Injectable 25 milliGRAM(s) IV Push every 6 hours PRN Rash and/or Itching  ALPRAZolam 0.5 milliGRAM(s) Oral at bedtime PRN sleeep      Review of Systems:   · Negative General Symptoms	no fever; no chills	  · Negative Skin Symptoms	no itching	  · Negative Ophthalmologic Symptoms	no diplopia; no photophobia	  · Negative ENMT Symptoms	no dry mouth; no dysphagia	  · Negative Respiratory and Thorax Symptoms	no wheezing; no dyspnea	  · Negative Cardiovascular Symptoms	no chest pain; no palpitations	  · Negative Gastrointestinal Symptoms	no nausea; no vomiting; no diarrhea	  · Negative General Genitourinary Symptoms	no dysuria	  · Negative Musculoskeletal Symptoms	generalized muscle weakness and debility	  · Negative Neurological Symptoms	no syncope; no headache	  · Negative Psychiatric Symptoms	no suicidal ideation; no anxiety	  · Additional ROS	ALL other ROS are negative.	    Physical Exam:   · Constitutional	detailed exam	  · Constitutional Details	no distress	  · Eyes	detailed exam	  · Eyes Details	PERRL; EOMI	  · ENMT	detailed exam	  · ENMT Details	mouth	  · Mouth	moist	  · ENMT Comments	trach in situ	  · Neck	detailed exam	  · Neck Details	supple; no JVD	  · Back	detailed exam	  · Back Details	normal shape	  · Respiratory	detailed exam	  · Respiratory Details	airway patent; breath sounds equal; good air movement; respirations non-labored	  · Cardiovascular	detailed exam	  · Cardiovascular Details	regular rate and rhythm 	  · Gastrointestinal	detailed exam	  · GI Normal	soft; nontender; no distention; bowel sounds normal; PEG in situ	  · Extremities	detailed exam	  · Extremities Details	no clubbing; no cyanosis	  · Vascular	Equal and normal pulses (carotid, femoral, dorsalis pedis)	  · Neurological	detailed exam	  · Neurological Details	alert and oriented x 3	  · Motor	No gross focal deficits - HERNANDEZ spont	  · Skin	detailed exam	  · Skin Details	warm and dry	  · Musculoskeletal	detailed exam	  · Musculoskeletal Details	ROM intact	  · Psychiatric	detailed exam	  · Psychiatric Details	normal affect; normal behavior	      DVT Prophylaxis: SQ heparin, venodynes    Advanced Directives: FULL  Discussed with: patient    Visit Information: SSQ

## 2017-09-10 NOTE — PROGRESS NOTE ADULT - SUBJECTIVE AND OBJECTIVE BOX
NEPHROLOGY INTERVAL HPI/OVERNIGHT EVENTS:  dw dr avani abdullahi  on slow steroid taper due to DRESS syndrome  UOP 1000ml  feels better today        MEDICATIONS  (STANDING):  metoprolol 12.5 milliGRAM(s) Oral two times a day  levothyroxine 150 MICROGram(s) Oral daily  heparin  Injectable 5000 Unit(s) SubCutaneous every 12 hours  predniSONE   Tablet 50 milliGRAM(s) Oral daily  DAPTOmycin IVPB 460 milliGRAM(s) IV Intermittent every 48 hours  DAPTOmycin IVPB   IV Intermittent   torsemide 40 milliGRAM(s) Oral daily  omeprazole Suspension 40 milliGRAM(s) Oral daily  chlorhexidine 0.12% Liquid 5 milliLiter(s) Swish and Spit two times a day  multivitamin   Solution 5 milliLiter(s) Oral daily    MEDICATIONS  (PRN):  diphenhydrAMINE   Injectable 25 milliGRAM(s) IV Push every 6 hours PRN Rash and/or Itching  ALPRAZolam 0.5 milliGRAM(s) Oral at bedtime PRN sleeep      Allergies    epinephrine (Unknown)  fentanyl (Unknown)  Reglan (Unknown)  Vancomycin Hydrochloride (Rash)  Zoloft (Unknown)    Intolerances        I&O's Detail    09 Sep 2017 07:01  -  10 Sep 2017 07:00  --------------------------------------------------------  IN:    IV PiggyBack: 100 mL    Oral Fluid: 150 mL    Vital HN: 1200 mL  Total IN: 1450 mL    OUT:    PEG (Percutaneous Endoscopic Gastrostomy) Tube: 1300 mL    Voided: 400 mL  Total OUT: 1700 mL    Total NET: -250 mL      10 Sep 2017 07:01  -  10 Sep 2017 17:05  --------------------------------------------------------  IN:    Enteral Tube Flush: 240 mL  Total IN: 240 mL    OUT:    PEG (Percutaneous Endoscopic Gastrostomy) Tube: 400 mL    Voided: 300 mL  Total OUT: 700 mL    Total NET: -460 mL          Vital Signs Last 24 Hrs  T(C): 36.2 (10 Sep 2017 15:58), Max: 36.6 (09 Sep 2017 23:24)  T(F): 97.2 (10 Sep 2017 15:58), Max: 97.8 (09 Sep 2017 23:24)  HR: 109 (10 Sep 2017 13:30) (81 - 109)  BP: 111/65 (10 Sep 2017 13:30) (100/64 - 111/65)  BP(mean): 76 (10 Sep 2017 13:30) (76 - 76)  RR: 22 (10 Sep 2017 13:30) (0 - 22)  SpO2: 100% (10 Sep 2017 13:30) (98% - 100%)  Daily     Daily     PHYSICAL EXAM:  General: alert. awake Ox3  HEENT: MMM  CV: s1s2 rrr  LUNGS: B/L CTA  EXT: no edema  sacral edema    LABS:                        9.4    13.5  )-----------( 372      ( 10 Sep 2017 05:14 )             28.7     09-10    136  |  92<L>  |  103<H>  ----------------------------<  116<H>  4.5   |  34<H>  |  3.58<H>    Ca    8.7      10 Sep 2017 05:14  Phos  3.6     09-10  Mg     2.0     09-10    TPro  x   /  Alb  2.0<L>  /  TBili  x   /  DBili  x   /  AST  x   /  ALT  x   /  AlkPhos  x   09-10        Phosphorus Level, Serum: 3.6 mg/dL (09-10 @ 05:14)  Magnesium, Serum: 2.0 mg/dL (09-10 @ 05:14)

## 2017-09-11 LAB
ANION GAP SERPL CALC-SCNC: 12 MMOL/L — SIGNIFICANT CHANGE UP (ref 5–17)
BUN SERPL-MCNC: 113 MG/DL — HIGH (ref 7–23)
CALCIUM SERPL-MCNC: 8.8 MG/DL — SIGNIFICANT CHANGE UP (ref 8.5–10.1)
CHLORIDE SERPL-SCNC: 91 MMOL/L — LOW (ref 96–108)
CO2 SERPL-SCNC: 33 MMOL/L — HIGH (ref 22–31)
CREAT SERPL-MCNC: 3.69 MG/DL — HIGH (ref 0.5–1.3)
CULTURE RESULTS: SIGNIFICANT CHANGE UP
GLUCOSE SERPL-MCNC: 121 MG/DL — HIGH (ref 70–99)
HCT VFR BLD CALC: 29.4 % — LOW (ref 39–50)
HGB BLD-MCNC: 9.4 G/DL — LOW (ref 13–17)
MAGNESIUM SERPL-MCNC: 2.1 MG/DL — SIGNIFICANT CHANGE UP (ref 1.6–2.6)
MCHC RBC-ENTMCNC: 32.2 GM/DL — SIGNIFICANT CHANGE UP (ref 32–36)
MCHC RBC-ENTMCNC: 32.9 PG — SIGNIFICANT CHANGE UP (ref 27–34)
MCV RBC AUTO: 102.3 FL — HIGH (ref 80–100)
PHOSPHATE SERPL-MCNC: 4.6 MG/DL — HIGH (ref 2.5–4.5)
PLATELET # BLD AUTO: 371 K/UL — SIGNIFICANT CHANGE UP (ref 150–400)
POTASSIUM SERPL-MCNC: 4.6 MMOL/L — SIGNIFICANT CHANGE UP (ref 3.5–5.3)
POTASSIUM SERPL-SCNC: 4.6 MMOL/L — SIGNIFICANT CHANGE UP (ref 3.5–5.3)
RBC # BLD: 2.87 M/UL — LOW (ref 4.2–5.8)
RBC # FLD: 17.6 % — HIGH (ref 10.3–14.5)
SODIUM SERPL-SCNC: 136 MMOL/L — SIGNIFICANT CHANGE UP (ref 135–145)
SPECIMEN SOURCE: SIGNIFICANT CHANGE UP
WBC # BLD: 11.2 K/UL — HIGH (ref 3.8–10.5)
WBC # FLD AUTO: 11.2 K/UL — HIGH (ref 3.8–10.5)

## 2017-09-11 RX ADMIN — CHLORHEXIDINE GLUCONATE 5 MILLILITER(S): 213 SOLUTION TOPICAL at 06:18

## 2017-09-11 RX ADMIN — Medication 40 MILLIGRAM(S): at 17:52

## 2017-09-11 RX ADMIN — Medication 12.5 MILLIGRAM(S): at 17:52

## 2017-09-11 RX ADMIN — CHLORHEXIDINE GLUCONATE 5 MILLILITER(S): 213 SOLUTION TOPICAL at 17:50

## 2017-09-11 RX ADMIN — Medication 50 MILLIGRAM(S): at 06:18

## 2017-09-11 RX ADMIN — Medication 40 MILLIGRAM(S): at 06:18

## 2017-09-11 RX ADMIN — DAPTOMYCIN 118.4 MILLIGRAM(S): 500 INJECTION, POWDER, LYOPHILIZED, FOR SOLUTION INTRAVENOUS at 17:56

## 2017-09-11 RX ADMIN — OMEPRAZOLE 40 MILLIGRAM(S): 10 CAPSULE, DELAYED RELEASE ORAL at 17:20

## 2017-09-11 RX ADMIN — Medication 150 MICROGRAM(S): at 06:18

## 2017-09-11 RX ADMIN — Medication 0.5 MILLIGRAM(S): at 23:08

## 2017-09-11 RX ADMIN — Medication 12.5 MILLIGRAM(S): at 06:18

## 2017-09-11 RX ADMIN — OXYCODONE AND ACETAMINOPHEN 1 TABLET(S): 5; 325 TABLET ORAL at 23:08

## 2017-09-11 RX ADMIN — OXYCODONE AND ACETAMINOPHEN 1 TABLET(S): 5; 325 TABLET ORAL at 00:01

## 2017-09-11 RX ADMIN — Medication 5 MILLILITER(S): at 17:51

## 2017-09-11 NOTE — PROGRESS NOTE ADULT - SUBJECTIVE AND OBJECTIVE BOX
CC:  Sepsis    HPI:    65 y/o male with Chronic resp failure s/p trach and PEG, CAD s/p PCI and CABG, HL s/p RT and hypothyroid admitted with MRSA sepsis.      9/11:  pt seen and examined in CCU. On SBT. No access for HD at this time. Pt would like to return to [facility]      PMH:  As above.     PSH:  As above.     FH: Non Contributory other than those listed in HPI    Social History:  As above    MEDICATIONS  (STANDING):  metoprolol 12.5 milliGRAM(s) Oral two times a day  levothyroxine 150 MICROGram(s) Oral daily  heparin  Injectable 5000 Unit(s) SubCutaneous every 12 hours  predniSONE   Tablet 50 milliGRAM(s) Oral daily  DAPTOmycin IVPB 460 milliGRAM(s) IV Intermittent every 48 hours  DAPTOmycin IVPB   IV Intermittent   omeprazole Suspension 40 milliGRAM(s) Oral daily  chlorhexidine 0.12% Liquid 5 milliLiter(s) Swish and Spit two times a day  multivitamin   Solution 5 milliLiter(s) Oral daily  torsemide 40 milliGRAM(s) Oral two times a day    MEDICATIONS  (PRN):  diphenhydrAMINE   Injectable 25 milliGRAM(s) IV Push every 6 hours PRN Rash and/or Itching  ALPRAZolam 0.5 milliGRAM(s) Oral at bedtime PRN sleeep  oxyCODONE    5 mG/acetaminophen 325 mG 1 Tablet(s) Oral every 6 hours PRN Moderate Pain (4 - 6)      Allergies: NKDA    ROS:  SEE BELOW        ICU Vital Signs Last 24 Hrs  T(C): 36.4 (11 Sep 2017 07:58), Max: 36.4 (11 Sep 2017 00:00)  T(F): 97.5 (11 Sep 2017 07:58), Max: 97.5 (11 Sep 2017 00:00)  HR: 80 (11 Sep 2017 08:00) (77 - 109)  BP: 112/68 (11 Sep 2017 06:15) (92/53 - 112/68)  BP(mean): 79 (11 Sep 2017 06:15) (62 - 79)  ABP: --  ABP(mean): --  RR: 3 (11 Sep 2017 08:00) (0 - 24)  SpO2: 100% (10 Sep 2017 13:30) (100% - 100%)      Mode: CPAP with PS  FiO2: 40  PEEP: 5  PS: 10  ITime: 1  PIP: 15      I&O's Summary    10 Sep 2017 07:01  -  11 Sep 2017 07:00  --------------------------------------------------------  IN: 2335 mL / OUT: 2675 mL / NET: -340 mL        Physical Exam:  SEE BELOW                          9.4    11.2  )-----------( 371      ( 11 Sep 2017 06:15 )             29.4       09-11    136  |  91<L>  |  113<H>  ----------------------------<  121<H>  4.6   |  33<H>  |  3.69<H>    Ca    8.8      11 Sep 2017 06:15  Phos  4.6     09-11  Mg     2.1     09-11    TPro  x   /  Alb  2.0<L>  /  TBili  x   /  DBili  x   /  AST  x   /  ALT  x   /  AlkPhos  x   09-10                    DVT Prophylaxis:                                                            Contraindication:     Advanced Directives:    Discussed with:    Visit Information:  Time spent excluding procedure:      ** Time is exclusive of billed procedures and/or teaching and/or routine family updates.

## 2017-09-11 NOTE — PROGRESS NOTE ADULT - ASSESSMENT
IMP:    MRSA sepsis   ESRD   Chronic resp failure s/p trach and PEG  Hypothyroid  Medication allergy    Plan:    IV abx per ID  To d/w Renal regarding potential need for future HD  PSV as clark  Steroids tapering (slow tapering)  Follow renal fx  TF to goal  DVT prophy--SCD and sqhep    CCU care--d/w CCU staff and pt. all concerns addressed

## 2017-09-11 NOTE — PROGRESS NOTE ADULT - SUBJECTIVE AND OBJECTIVE BOX
NEPHROLOGY INTERVAL HPI/OVERNIGHT EVENTS:  9/11 SY  No acute events noted over the weekend.  Repeat 24 hr urine with Cr cl of 6 cc/min.  Alert, no apparent distress    HPI:  This patient is a 67 year old male with PMH significant for:                     Hodgkings Disease treated with Radiation and RT                     CAD - s/p cath in 2009, cabg x 4                     chronic pleural effusion Right chest(s/p VATS 2016)                     aspiration Pneumonia 2016,                      ESRD on Dialysis since 2016                     Rash to vanco, or zoloft on steroid taper                     chronic vent dependence                     hypothyroidism  Yesterday nursing home developed increased HR with cyanosis sat ok  In ED there was high peak pressures, trach may have been positional but wbc has increased to 30, was 14 at [facility]..  Admitted for evaluation, needs HD today.  Initially refused, then agreed to 3 hr hd       PAST MEDICAL & SURGICAL HISTORY:  Hypothyroidism  ESRD on hemodialysis  Angina pectoris  Hodgkin's lymphoma  Tracheostomy dependent  Anemia    MEDICATIONS  (STANDING):  metoprolol 12.5 milliGRAM(s) Oral two times a day  levothyroxine 150 MICROGram(s) Oral daily  heparin  Injectable 5000 Unit(s) SubCutaneous every 12 hours  predniSONE   Tablet 50 milliGRAM(s) Oral daily  DAPTOmycin IVPB 460 milliGRAM(s) IV Intermittent every 48 hours  DAPTOmycin IVPB   IV Intermittent   omeprazole Suspension 40 milliGRAM(s) Oral daily  chlorhexidine 0.12% Liquid 5 milliLiter(s) Swish and Spit two times a day  multivitamin   Solution 5 milliLiter(s) Oral daily  torsemide 40 milliGRAM(s) Oral two times a day    MEDICATIONS  (PRN):  diphenhydrAMINE   Injectable 25 milliGRAM(s) IV Push every 6 hours PRN Rash and/or Itching  ALPRAZolam 0.5 milliGRAM(s) Oral at bedtime PRN sleeep  oxyCODONE    5 mG/acetaminophen 325 mG 1 Tablet(s) Oral every 6 hours PRN Moderate Pain (4 - 6)          Vital Signs Last 24 Hrs  T(C): 36.4 (11 Sep 2017 07:58), Max: 36.4 (11 Sep 2017 00:00)  T(F): 97.5 (11 Sep 2017 07:58), Max: 97.5 (11 Sep 2017 00:00)  HR: 88 (11 Sep 2017 11:00) (77 - 109)  BP: 112/68 (11 Sep 2017 06:15) (92/53 - 112/68)  BP(mean): 79 (11 Sep 2017 06:15) (62 - 79)  RR: 16 (11 Sep 2017 11:00) (0 - 25)  SpO2: 100% (10 Sep 2017 13:30) (100% - 100%)  Daily     Daily     09-10 @ 07:01  -  09-11 @ 07:00  --------------------------------------------------------  IN: 2335 mL / OUT: 2675 mL / NET: -340 mL    09-11 @ 07:01  -  09-11 @ 13:02  --------------------------------------------------------  IN: 0 mL / OUT: 200 mL / NET: -200 mL        PHYSICAL EXAM:  Alert, no apparent distress on vent /Trach.  GENERAL: No apparent distress  CHEST/LUNG: bilateral scattered rhonchi  HEART: S1S2 RRR  ABDOMEN: soft  EXTREMITIES: presacral and thigh edema stable  SKIN:     LABS:                        9.4    11.2  )-----------( 371      ( 11 Sep 2017 06:15 )             29.4     09-11    136  |  91<L>  |  113<H>  ----------------------------<  121<H>  4.6   |  33<H>  |  3.69<H>    Ca    8.8      11 Sep 2017 06:15  Phos  4.6     09-11  Mg     2.1     09-11    TPro  x   /  Alb  2.0<L>  /  TBili  x   /  DBili  x   /  AST  x   /  ALT  x   /  AlkPhos  x   09-10        Magnesium, Serum: 2.1 mg/dL (09-11 @ 06:15)  Phosphorus Level, Serum: 4.6 mg/dL (09-11 @ 06:15)          RADIOLOGY & ADDITIONAL TESTS:

## 2017-09-11 NOTE — PROGRESS NOTE ADULT - ASSESSMENT
This patient is a 67 year old male with PMH significant for: Hodgkings Disease treated with Radiation, CAD - s/p cath in 2009, cabg x 4, chronic pleural effusion Right chest, aspiration Pneumonia 2016, ESRD on Dialysis since 2016, Rash to vanco, or zoloft on steroid taper, chronic vent dependence, hypothyroidism..  Yesterday nursing home developed increased HR with cyanosis sat ok  In ED there was high peak pressures, trach may have been positional but wbc has increased to 30, was 14 at [facility]..  Admitted for evaluation, needs HD today.  Initially refused, then agreed to 3 hr hd   Medical management as per ICU  EPO as per outpt orders    9/1 addendum 3:15 pm  seen on hd doing well    9/2 SY  --Pt with APRYL on HD since 5/2017.  Unclear if renal recovery can be expected.   Pt's pre HD creat was only 3.1.  Now found with positive blood culture with gram positive cocci.   Most likely line sepsis.  Will remove catheter today.  Will continue to monitor for any renal recovery.  Limit free water intake to prevent hyponatremia.  Continue diuretics.    9/3 SY  --Permcath removed due to MRSA bacteremia.  Pt on HD since 5/2017 due to ATN /Sepsis.  Unclear if enough renal recovery.  Will follow trend for next 2-3 days to determine residual renal fx.  --Continue abtx for bacteremia.    9/4 SY  --MRSA Bacteremia.  Continue ABTX.  --Creat continues to slowly rise.   Again unclear if pt has enough residula fx to remain off dialysis.  Continue to assess daily.  --If creat continues to rise, then will plan Permcath placement in 2-3 days.     9/5  MRSA bacteremia, on Cubicin  - last hd friday, catheter removed due to MRSA, stable at this time  awake alert no new issues  wants to hold off HD, thinks has adequate renal function to go back to rehab at [place] near his house, off dialysis  d/w Dr Lamont Abdullahi, agrees to stay for now    9/6 SY  --APRYL on HD, last TX on 9/1  Renal parameters continue to slowly increase,  therefore, even with adequate urine volume, pt most anjelicaley will need to restart HD   Will monitor for another 1-2 days.  Pt now agreeable with plan for possible permcath placement on 9/8. Friday.  --MRSA bacteremia --continue abtx.    9/7 SY  --APRYL on temporary HD support.  Last tx on 9/1.  Urine output has been adequate.   Unclear if renal recovery may be adequate to remain off dialysis. (  increase in creat may be low due to muscle wasting.)  Will attempt to confirm residual renal fx prior to placing permcath with 24 hour urine collection.  --Fluid and electrolytes stable at this point.  --MRSA bacteremia.  Continue abtx.    9/8  creat cont to increase pt states feels well  elevation in BUN may be due to prednisone as well  does not want catheter placement yet  no urgency for hd, reminded pt he cannot get permcath on sunday by IR    9/9 MK  - APRYL with slowly rising cr and the BUN elevation confounded with prednisone administration   - electrolytes stable  - continue to monitor the renal function over the weekend and will likely need IR placement of catheter, pt aware of plan.    accepting but diasppointed    9/10 MK  - APRYL with continued rising scr and BUn elevation confounded by prednisone administration  - stable renal paramenter, will continue to monitor since with UOP improving, INC torsemide  - pt aware that will assess daily for the placement of HD catheter.  - leukocytosis: likely steroid induced, will fu and monitor  dw dr avani abdullahi    9/11 SY  --Urine output is maintained but clearance is minimal with continuously increasing renal parameters.   Therefore, will need to resume dialysis prior to d/c back to NH.  Explained in detail to pt.  Will plan for permcath placement  in am and resume HD.  --Leukocytosis now stabilized with repeat blood cultures negative.

## 2017-09-12 PROBLEM — Z00.00 ENCOUNTER FOR PREVENTIVE HEALTH EXAMINATION: Status: ACTIVE | Noted: 2017-09-12

## 2017-09-12 LAB
ANION GAP SERPL CALC-SCNC: 11 MMOL/L — SIGNIFICANT CHANGE UP (ref 5–17)
APTT BLD: 27.6 SEC — SIGNIFICANT CHANGE UP (ref 27.5–37.4)
BUN SERPL-MCNC: 127 MG/DL — HIGH (ref 7–23)
CALCIUM SERPL-MCNC: 9.1 MG/DL — SIGNIFICANT CHANGE UP (ref 8.5–10.1)
CHLORIDE SERPL-SCNC: 91 MMOL/L — LOW (ref 96–108)
CO2 SERPL-SCNC: 34 MMOL/L — HIGH (ref 22–31)
CREAT SERPL-MCNC: 3.72 MG/DL — HIGH (ref 0.5–1.3)
GLUCOSE SERPL-MCNC: 77 MG/DL — SIGNIFICANT CHANGE UP (ref 70–99)
HCT VFR BLD CALC: 29.4 % — LOW (ref 39–50)
HGB BLD-MCNC: 9.5 G/DL — LOW (ref 13–17)
INR BLD: 1.22 RATIO — HIGH (ref 0.88–1.16)
MCHC RBC-ENTMCNC: 32.3 GM/DL — SIGNIFICANT CHANGE UP (ref 32–36)
MCHC RBC-ENTMCNC: 32.8 PG — SIGNIFICANT CHANGE UP (ref 27–34)
MCV RBC AUTO: 101.5 FL — HIGH (ref 80–100)
PLATELET # BLD AUTO: 391 K/UL — SIGNIFICANT CHANGE UP (ref 150–400)
POTASSIUM SERPL-MCNC: 4.7 MMOL/L — SIGNIFICANT CHANGE UP (ref 3.5–5.3)
POTASSIUM SERPL-SCNC: 4.7 MMOL/L — SIGNIFICANT CHANGE UP (ref 3.5–5.3)
PROTHROM AB SERPL-ACNC: 13.2 SEC — HIGH (ref 9.8–12.7)
RBC # BLD: 2.9 M/UL — LOW (ref 4.2–5.8)
RBC # FLD: 17.4 % — HIGH (ref 10.3–14.5)
SODIUM SERPL-SCNC: 136 MMOL/L — SIGNIFICANT CHANGE UP (ref 135–145)
WBC # BLD: 11.7 K/UL — HIGH (ref 3.8–10.5)
WBC # FLD AUTO: 11.7 K/UL — HIGH (ref 3.8–10.5)

## 2017-09-12 PROCEDURE — 77001 FLUOROGUIDE FOR VEIN DEVICE: CPT | Mod: 26

## 2017-09-12 PROCEDURE — 36558 INSERT TUNNELED CV CATH: CPT

## 2017-09-12 PROCEDURE — 76937 US GUIDE VASCULAR ACCESS: CPT | Mod: 26

## 2017-09-12 RX ORDER — ERYTHROPOIETIN 10000 [IU]/ML
4000 INJECTION, SOLUTION INTRAVENOUS; SUBCUTANEOUS
Qty: 0 | Refills: 0 | Status: DISCONTINUED | OUTPATIENT
Start: 2017-09-12 | End: 2017-09-15

## 2017-09-12 RX ORDER — ERYTHROPOIETIN 10000 [IU]/ML
4000 INJECTION, SOLUTION INTRAVENOUS; SUBCUTANEOUS ONCE
Qty: 0 | Refills: 0 | Status: COMPLETED | OUTPATIENT
Start: 2017-09-12 | End: 2017-09-12

## 2017-09-12 RX ADMIN — Medication 50 MILLIGRAM(S): at 10:30

## 2017-09-12 RX ADMIN — Medication 40 MILLIGRAM(S): at 10:31

## 2017-09-12 RX ADMIN — Medication 12.5 MILLIGRAM(S): at 10:30

## 2017-09-12 RX ADMIN — Medication 150 MICROGRAM(S): at 09:00

## 2017-09-12 RX ADMIN — ERYTHROPOIETIN 4000 UNIT(S): 10000 INJECTION, SOLUTION INTRAVENOUS; SUBCUTANEOUS at 19:12

## 2017-09-12 RX ADMIN — CHLORHEXIDINE GLUCONATE 5 MILLILITER(S): 213 SOLUTION TOPICAL at 06:59

## 2017-09-12 RX ADMIN — OXYCODONE AND ACETAMINOPHEN 1 TABLET(S): 5; 325 TABLET ORAL at 22:41

## 2017-09-12 RX ADMIN — Medication 0.5 MILLIGRAM(S): at 22:41

## 2017-09-12 RX ADMIN — OMEPRAZOLE 40 MILLIGRAM(S): 10 CAPSULE, DELAYED RELEASE ORAL at 10:31

## 2017-09-12 RX ADMIN — Medication 5 MILLILITER(S): at 10:30

## 2017-09-12 NOTE — BRIEF OPERATIVE NOTE - PROCEDURE
Insertion of dual lumen tunneled cuffed central venous dialysis catheter  09/12/2017    Active  FELICITASELROD

## 2017-09-12 NOTE — PROGRESS NOTE ADULT - SUBJECTIVE AND OBJECTIVE BOX
NEPHROLOGY INTERVAL HPI/OVERNIGHT EVENTS:   SY  No acute events overnight.  On CPAP comfortably.     SY  No acute events noted over the weekend.  Repeat 24 hr urine with Cr cl of 6 cc/min.  Alert, no apparent distress    HPI:  This patient is a 67 year old male with PMH significant for:                     Hodgkings Disease treated with Radiation and RT                     CAD - s/p cath in , cabg x 4                     chronic pleural effusion Right chest(s/p VATS )                     aspiration Pneumonia ,                      ESRD on Dialysis since                      Rash to vanco, or zoloft on steroid taper                     chronic vent dependence                     hypothyroidism  Yesterday nursing home developed increased HR with cyanosis sat ok  In ED there was high peak pressures, trach may have been positional but wbc has increased to 30, was 14 at [facility]..  Admitted for evaluation, needs HD today.  Initially refused, then agreed to 3 hr hd       PAST MEDICAL & SURGICAL HISTORY:  Hypothyroidism  ESRD on hemodialysis  Angina pectoris  Hodgkin's lymphoma  Tracheostomy dependent  Anemia  MEDICATIONS  (STANDING):  metoprolol 12.5 milliGRAM(s) Oral two times a day  levothyroxine 150 MICROGram(s) Oral daily  heparin  Injectable 5000 Unit(s) SubCutaneous every 12 hours  predniSONE   Tablet 50 milliGRAM(s) Oral daily  DAPTOmycin IVPB 460 milliGRAM(s) IV Intermittent every 48 hours  DAPTOmycin IVPB   IV Intermittent   omeprazole Suspension 40 milliGRAM(s) Oral daily  chlorhexidine 0.12% Liquid 5 milliLiter(s) Swish and Spit two times a day  multivitamin   Solution 5 milliLiter(s) Oral daily  torsemide 40 milliGRAM(s) Oral two times a day    MEDICATIONS  (PRN):  diphenhydrAMINE   Injectable 25 milliGRAM(s) IV Push every 6 hours PRN Rash and/or Itching  ALPRAZolam 0.5 milliGRAM(s) Oral at bedtime PRN sleeep  oxyCODONE    5 mG/acetaminophen 325 mG 1 Tablet(s) Oral every 6 hours PRN Moderate Pain (4 - 6)          Vital Signs Last 24 Hrs  T(C): 36.6 (12 Sep 2017 05:00), Max: 36.6 (12 Sep 2017 05:00)  T(F): 97.8 (12 Sep 2017 05:00), Max: 97.8 (12 Sep 2017 05:00)  HR: 93 (12 Sep 2017 05:13) (88 - 105)  BP: 104/72 (12 Sep 2017 01:00) (104/72 - 110/71)  BP(mean): 80 (11 Sep 2017 16:00) (80 - 80)  RR: 18 (11 Sep 2017 22:00) (0 - 28)  SpO2: --  Daily     Daily Weight in k (12 Sep 2017 05:00)     @ 07:01  -   @ 07:00  --------------------------------------------------------  IN: 750 mL / OUT: 1300 mL / NET: -550 mL        PHYSICAL EXAM:  Alert and appropriate  GENERAL: no acute distress  CHEST/LUNG: fair air entry bilaterally   HEART: S1S2 RRR  ABDOMEN: soft  EXTREMITIES: positive thigh edema  SKIN:     LABS:                        9.5    11.7  )-----------( 391      ( 12 Sep 2017 05:50 )             29.4     12    136  |  91<L>  |  127<H>  ----------------------------<  77  4.7   |  34<H>  |  3.72<H>    Ca    9.1      12 Sep 2017 05:50  Phos  4.6     11  Mg     2.1     -11      PT/INR - ( 12 Sep 2017 05:50 )   PT: 13.2 sec;   INR: 1.22 ratio         PTT - ( 12 Sep 2017 05:50 )  PTT:27.6 sec            RADIOLOGY & ADDITIONAL TESTS:

## 2017-09-12 NOTE — PROGRESS NOTE ADULT - SUBJECTIVE AND OBJECTIVE BOX
CC:  Sepsis     HPI:    65 y/o male with Chronic resp failure s/p trach and PEG, CAD s/p PCI and CABG, HL s/p RT and hypothyroid admitted with MRSA sepsis.      9/11:  pt seen and examined in CCU. On SBT. No access for HD at this time. Pt would like to return to [facility]    9/12: above noted. For tunneled cath today followed by HD.  No other events overnight       PMH:  As above.     PSH:  As above.     FH: Non Contributory other than those listed in HPI    Social History:  As above    MEDICATIONS  (STANDING):  metoprolol 12.5 milliGRAM(s) Oral two times a day  levothyroxine 150 MICROGram(s) Oral daily  heparin  Injectable 5000 Unit(s) SubCutaneous every 12 hours  predniSONE   Tablet 50 milliGRAM(s) Oral daily  DAPTOmycin IVPB 460 milliGRAM(s) IV Intermittent every 48 hours  DAPTOmycin IVPB   IV Intermittent   omeprazole Suspension 40 milliGRAM(s) Oral daily  chlorhexidine 0.12% Liquid 5 milliLiter(s) Swish and Spit two times a day  multivitamin   Solution 5 milliLiter(s) Oral daily  torsemide 40 milliGRAM(s) Oral two times a day    MEDICATIONS  (PRN):  diphenhydrAMINE   Injectable 25 milliGRAM(s) IV Push every 6 hours PRN Rash and/or Itching  ALPRAZolam 0.5 milliGRAM(s) Oral at bedtime PRN sleeep  oxyCODONE    5 mG/acetaminophen 325 mG 1 Tablet(s) Oral every 6 hours PRN Moderate Pain (4 - 6)      Allergies: NKDA    ROS:  SEE BELOW        ICU Vital Signs Last 24 Hrs  T(C): 36.6 (12 Sep 2017 05:00), Max: 36.6 (12 Sep 2017 05:00)  T(F): 97.8 (12 Sep 2017 05:00), Max: 97.8 (12 Sep 2017 05:00)  HR: 93 (12 Sep 2017 05:13) (88 - 105)  BP: 104/72 (12 Sep 2017 01:00) (104/72 - 110/71)  BP(mean): 80 (11 Sep 2017 16:00) (80 - 80)  ABP: --  ABP(mean): --  RR: 18 (11 Sep 2017 22:00) (0 - 28)  SpO2: --      Mode: CPAP with PS  FiO2: 40  PEEP: 5  PIP: 18      I&O's Summary    11 Sep 2017 07:01  -  12 Sep 2017 07:00  --------------------------------------------------------  IN: 750 mL / OUT: 1300 mL / NET: -550 mL        Physical Exam:  SEE BELOW                          9.5    11.7  )-----------( 391      ( 12 Sep 2017 05:50 )             29.4       09-12    136  |  91<L>  |  127<H>  ----------------------------<  77  4.7   |  34<H>  |  3.72<H>    Ca    9.1      12 Sep 2017 05:50  Phos  4.6     09-11  Mg     2.1     09-11                      DVT Prophylaxis:                                                            Contraindication:     Advanced Directives:    Discussed with:    Visit Information:  Time spent excluding procedure:      ** Time is exclusive of billed procedures and/or teaching and/or routine family updates.

## 2017-09-12 NOTE — PROGRESS NOTE ADULT - ASSESSMENT
This patient is a 67 year old male with PMH of Hodgkins disease s/p XRT/chemo, CAD s/p cath 2009, CABG X 4, chronic R pleural effusion s/p VATS 206, asp , ESRD on HD since May 2016, chronic resp failure s/p trach, vent dependent, hypothyroidism admitted 8/31 from NH where he developed tachycardia and cyanosis, was saturating normally, not hypoxic but was sent for further eval, here afebrile, wbc ct elevated to 30, CT chest showed moderate R loculated hydropneumothorax with pleural thickening/ L pleural effusion/pleural thickening, 4 bottles blood cx growing MRSA, + perm catheter in place for past 2 months, has vanco allergy, was given IV dapto/cefepime.     1. MRSA sepsis/probable tessio-related infection/chronic resp failure s/p trach/R loculated hydropneumothorax/L pleural effusion/ESRD/immunocompromised host/rash    - slowly improving, no fevers, wbc ct normalized, perm-catheter removed 9/2  - Cr increasing, plan for re-initiation of HD and perm-catheter placement  - blood cx with MRSA 4 bottles on 8/31; repeat blood cx 9/3 no growth  - on IV daptomycin  460mg q48h, post dialysis on HD days, cpk weekly check (baseline normal) #12  - will require 4 weeks of daptomycin 460mg q48h until 9/30/17 with weekly cbc, cmp, esr, crp and cpk  - sputum cx growing stenotrophamonas/MRSA  - has R hydropneumothorax/loculated effusion but likely chronic than acute, hx of prior VATS, sputum cx likely d/t colonization  - completed 3 days of cefepime and 5 days of ceftazidime   - continue with steroids/antihistamines for rash which could be resolving drug-related rash from prior + underlying hx of ezcema  - reports that prior to admission, about 4-8 wks ago, he had increased drainage and redness around his peg tube they were concerned about infection he was given IV vancomycin/ceftazidime, he subsequently had life threatening reaction with diffuse SJS-like rash   - he has tolerated cephalosporins here with no issues so culprit was likely vancomycin  - f/u cbc  - trach care  - -tolerating abx well so far; no side effects noted  -reason for abx use and side effects reviewed with patient  - supportive care

## 2017-09-12 NOTE — PROGRESS NOTE ADULT - ASSESSMENT
IMP:    MRSA sepsis on dapto   ESRD   Chronic resp failure s/p trach and PEG  Hypothyroid  Medication allergy resulting in diffuse rash    Plan:    IV abx per ID  Tunneled cath today  PSV as clark  Steroids tapering (slow tapering)  Follow renal fx  TF to goal after procedure  DVT prophy--SCD and sqhep    CCU care--d/w CCU staff and pt. all concerns addressed.  Stable for tx barring any complication from procedure

## 2017-09-12 NOTE — BRIEF OPERATIVE NOTE - OPERATION/FINDINGS
sono and fluoro. lij access. 23cm dual lumen cuffed permcath. tip at ra/svc. good return. ok to use. pt clark proc well.

## 2017-09-12 NOTE — PROGRESS NOTE ADULT - ASSESSMENT
This patient is a 67 year old male with PMH significant for: Hodgkings Disease treated with Radiation, CAD - s/p cath in 2009, cabg x 4, chronic pleural effusion Right chest, aspiration Pneumonia 2016, ESRD on Dialysis since 2016, Rash to vanco, or zoloft on steroid taper, chronic vent dependence, hypothyroidism..  Yesterday nursing home developed increased HR with cyanosis sat ok  In ED there was high peak pressures, trach may have been positional but wbc has increased to 30, was 14 at [facility]..  Admitted for evaluation, needs HD today.  Initially refused, then agreed to 3 hr hd   Medical management as per ICU  EPO as per outpt orders    9/1 addendum 3:15 pm  seen on hd doing well    9/2 SY  --Pt with APRYL on HD since 5/2017.  Unclear if renal recovery can be expected.   Pt's pre HD creat was only 3.1.  Now found with positive blood culture with gram positive cocci.   Most likely line sepsis.  Will remove catheter today.  Will continue to monitor for any renal recovery.  Limit free water intake to prevent hyponatremia.  Continue diuretics.    9/3 SY  --Permcath removed due to MRSA bacteremia.  Pt on HD since 5/2017 due to ATN /Sepsis.  Unclear if enough renal recovery.  Will follow trend for next 2-3 days to determine residual renal fx.  --Continue abtx for bacteremia.    9/4 SY  --MRSA Bacteremia.  Continue ABTX.  --Creat continues to slowly rise.   Again unclear if pt has enough residula fx to remain off dialysis.  Continue to assess daily.  --If creat continues to rise, then will plan Permcath placement in 2-3 days.     9/5  MRSA bacteremia, on Cubicin  - last hd friday, catheter removed due to MRSA, stable at this time  awake alert no new issues  wants to hold off HD, thinks has adequate renal function to go back to rehab at [facility] near his house, off dialysis  d/w Dr Lamont Abdullahi, agrees to stay for now    9/6 SY  --APRYL on HD, last TX on 9/1  Renal parameters continue to slowly increase,  therefore, even with adequate urine volume, pt most anjelicaley will need to restart HD   Will monitor for another 1-2 days.  Pt now agreeable with plan for possible permcath placement on 9/8. Friday.  --MRSA bacteremia --continue abtx.    9/7 SY  --APRYL on temporary HD support.  Last tx on 9/1.  Urine output has been adequate.   Unclear if renal recovery may be adequate to remain off dialysis. (  increase in creat may be low due to muscle wasting.)  Will attempt to confirm residual renal fx prior to placing permcath with 24 hour urine collection.  --Fluid and electrolytes stable at this point.  --MRSA bacteremia.  Continue abtx.    9/8  creat cont to increase pt states feels well  elevation in BUN may be due to prednisone as well  does not want catheter placement yet  no urgency for hd, reminded pt he cannot get permcath on sunday by IR    9/9 MK  - APRYL with slowly rising cr and the BUN elevation confounded with prednisone administration   - electrolytes stable  - continue to monitor the renal function over the weekend and will likely need IR placement of catheter, pt aware of plan.    accepting but diasppointed    9/10 MK  - APRYL with continued rising scr and BUn elevation confounded by prednisone administration  - stable renal paramenter, will continue to monitor since with UOP improving, INC torsemide  - pt aware that will assess daily for the placement of HD catheter.  - leukocytosis: likely steroid induced, will fu and monitor  dw dr avani abdullahi    9/11 SY  --Urine output is maintained but clearance is minimal with continuously increasing renal parameters.   Therefore, will need to resume dialysis prior to d/c back to NH.  Explained in detail to pt.  Will plan for permcath placement  in am and resume HD.  --Leukocytosis now stabilized with repeat blood cultures negative.    9/12 SY  --APRYL   --Appears still dialysis dependent.  Permcath placement and HD today.   Will maintain on BIW schedule.  --Leukocytosis somewhat variable but stable.  Continue abtx.  --D/c planning.

## 2017-09-13 RX ORDER — POLYETHYLENE GLYCOL 3350 17 G/17G
17 POWDER, FOR SOLUTION ORAL DAILY
Qty: 0 | Refills: 0 | Status: DISCONTINUED | OUTPATIENT
Start: 2017-09-13 | End: 2017-09-15

## 2017-09-13 RX ADMIN — Medication 12.5 MILLIGRAM(S): at 05:14

## 2017-09-13 RX ADMIN — Medication 40 MILLIGRAM(S): at 17:17

## 2017-09-13 RX ADMIN — OXYCODONE AND ACETAMINOPHEN 1 TABLET(S): 5; 325 TABLET ORAL at 23:04

## 2017-09-13 RX ADMIN — Medication 12.5 MILLIGRAM(S): at 17:16

## 2017-09-13 RX ADMIN — OMEPRAZOLE 40 MILLIGRAM(S): 10 CAPSULE, DELAYED RELEASE ORAL at 14:06

## 2017-09-13 RX ADMIN — POLYETHYLENE GLYCOL 3350 17 GRAM(S): 17 POWDER, FOR SOLUTION ORAL at 17:17

## 2017-09-13 RX ADMIN — DAPTOMYCIN 118.4 MILLIGRAM(S): 500 INJECTION, POWDER, LYOPHILIZED, FOR SOLUTION INTRAVENOUS at 17:15

## 2017-09-13 RX ADMIN — OXYCODONE AND ACETAMINOPHEN 1 TABLET(S): 5; 325 TABLET ORAL at 22:27

## 2017-09-13 RX ADMIN — Medication 0.5 MILLIGRAM(S): at 22:28

## 2017-09-13 RX ADMIN — Medication 40 MILLIGRAM(S): at 05:14

## 2017-09-13 RX ADMIN — Medication 5 MILLILITER(S): at 14:11

## 2017-09-13 RX ADMIN — Medication 50 MILLIGRAM(S): at 05:14

## 2017-09-13 RX ADMIN — Medication 150 MICROGRAM(S): at 05:14

## 2017-09-13 RX ADMIN — CHLORHEXIDINE GLUCONATE 5 MILLILITER(S): 213 SOLUTION TOPICAL at 17:16

## 2017-09-13 RX ADMIN — CHLORHEXIDINE GLUCONATE 5 MILLILITER(S): 213 SOLUTION TOPICAL at 05:14

## 2017-09-13 NOTE — PROGRESS NOTE ADULT - SUBJECTIVE AND OBJECTIVE BOX
CC:  Sepsis     HPI:    67 y/o male with Chronic resp failure s/p trach and PEG, CAD s/p PCI and CABG, HL s/p RT and hypothyroid admitted with MRSA sepsis.      9/11:  pt seen and examined in CCU. On SBT. No access for HD at this time. Pt would like to return to [facility]    9/12: above noted. For tunneled cath today followed by HD.  No other events overnight     9/13: S/P tunneled cath placement and HD on 9/12.  ID input appreciated. stable for tx to [facility] if bed available       PMH:  As above.     PSH:  As above.     FH: Non Contributory other than those listed in HPI    Social History:  As above    MEDICATIONS  (STANDING):  metoprolol 12.5 milliGRAM(s) Oral two times a day  levothyroxine 150 MICROGram(s) Oral daily  heparin  Injectable 5000 Unit(s) SubCutaneous every 12 hours  predniSONE   Tablet 50 milliGRAM(s) Oral daily  DAPTOmycin IVPB 460 milliGRAM(s) IV Intermittent every 48 hours  DAPTOmycin IVPB   IV Intermittent   omeprazole Suspension 40 milliGRAM(s) Oral daily  chlorhexidine 0.12% Liquid 5 milliLiter(s) Swish and Spit two times a day  multivitamin   Solution 5 milliLiter(s) Oral daily  torsemide 40 milliGRAM(s) Oral two times a day  epoetin ramón Injectable 4000 Unit(s) IV Push <User Schedule>    MEDICATIONS  (PRN):  diphenhydrAMINE   Injectable 25 milliGRAM(s) IV Push every 6 hours PRN Rash and/or Itching  ALPRAZolam 0.5 milliGRAM(s) Oral at bedtime PRN sleeep  oxyCODONE    5 mG/acetaminophen 325 mG 1 Tablet(s) Oral every 6 hours PRN Moderate Pain (4 - 6)      Allergies: NKDA    ROS:  SEE BELOW        ICU Vital Signs Last 24 Hrs  T(C): 36.4 (13 Sep 2017 05:00), Max: 36.7 (12 Sep 2017 16:35)  T(F): 97.6 (13 Sep 2017 05:00), Max: 98.1 (12 Sep 2017 20:47)  HR: 98 (13 Sep 2017 08:11) (88 - 113)  BP: 94/69 (13 Sep 2017 08:11) (88/48 - 107/67)  BP(mean): 76 (13 Sep 2017 08:11) (66 - 79)  ABP: --  ABP(mean): --  RR: 26 (13 Sep 2017 08:11) (4 - 28)  SpO2: 99% (13 Sep 2017 08:11) (99% - 99%)      Mode: AC/ CMV (Assist Control/ Continuous Mandatory Ventilation)  RR (machine): 14  TV (machine): 400  FiO2: 40  PEEP: 5  ITime: 1  PIP: 29      I&O's Summary    12 Sep 2017 07:01  -  13 Sep 2017 07:00  --------------------------------------------------------  IN: 1290 mL / OUT: 1700 mL / NET: -410 mL    13 Sep 2017 07:01  -  13 Sep 2017 08:48  --------------------------------------------------------  IN: 0 mL / OUT: 200 mL / NET: -200 mL        Physical Exam:  SEE BELOW                          9.5    11.7  )-----------( 391      ( 12 Sep 2017 05:50 )             29.4       09-12    136  |  91<L>  |  127<H>  ----------------------------<  77  4.7   |  34<H>  |  3.72<H>    Ca    9.1      12 Sep 2017 05:50                      DVT Prophylaxis:                                                            Contraindication:     Advanced Directives:    Discussed with:    Visit Information:  Time spent excluding procedure:      ** Time is exclusive of billed procedures and/or teaching and/or routine family updates.

## 2017-09-13 NOTE — PROGRESS NOTE ADULT - ASSESSMENT
IMP:    MRSA sepsis on dapto 480mg IV q 48 following HD  ESRD   Chronic resp failure s/p trach and PEG  Hypothyroid  Medication allergy resulting in diffuse rash    Plan:    IV abx until 9/30  PSV as clark  Steroids tapering (slow tapering)  TF to goal   PT  DVT prophy--SCD and sqhep    Tx to [facility] when bed available--d/w CCU staff and pt and SW

## 2017-09-13 NOTE — PROGRESS NOTE ADULT - ASSESSMENT
This patient is a 67 year old male with PMH significant for: Hodgkings Disease treated with Radiation, CAD - s/p cath in 2009, cabg x 4, chronic pleural effusion Right chest, aspiration Pneumonia 2016, ESRD on Dialysis since 2016, Rash to vanco, or zoloft on steroid taper, chronic vent dependence, hypothyroidism..  Yesterday nursing home developed increased HR with cyanosis sat ok  In ED there was high peak pressures, trach may have been positional but wbc has increased to 30, was 14 at [facility]..  Admitted for evaluation, needs HD today.  Initially refused, then agreed to 3 hr hd   Medical management as per ICU  EPO as per outpt orders    9/1 addendum 3:15 pm  seen on hd doing well    9/2 SY  --Pt with APRYL on HD since 5/2017.  Unclear if renal recovery can be expected.   Pt's pre HD creat was only 3.1.  Now found with positive blood culture with gram positive cocci.   Most likely line sepsis.  Will remove catheter today.  Will continue to monitor for any renal recovery.  Limit free water intake to prevent hyponatremia.  Continue diuretics.    9/3 SY  --Permcath removed due to MRSA bacteremia.  Pt on HD since 5/2017 due to ATN /Sepsis.  Unclear if enough renal recovery.  Will follow trend for next 2-3 days to determine residual renal fx.  --Continue abtx for bacteremia.    9/4 SY  --MRSA Bacteremia.  Continue ABTX.  --Creat continues to slowly rise.   Again unclear if pt has enough residula fx to remain off dialysis.  Continue to assess daily.  --If creat continues to rise, then will plan Permcath placement in 2-3 days.     9/5  MRSA bacteremia, on Cubicin  - last hd friday, catheter removed due to MRSA, stable at this time  awake alert no new issues  wants to hold off HD, thinks has adequate renal function to go back to rehab at [place] near his house, off dialysis  d/w Dr Lamont Abdullahi, agrees to stay for now    9/6 SY  --APRYL on HD, last TX on 9/1  Renal parameters continue to slowly increase,  therefore, even with adequate urine volume, pt most anjelicaley will need to restart HD   Will monitor for another 1-2 days.  Pt now agreeable with plan for possible permcath placement on 9/8. Friday.  --MRSA bacteremia --continue abtx.    9/7 SY  --APRYL on temporary HD support.  Last tx on 9/1.  Urine output has been adequate.   Unclear if renal recovery may be adequate to remain off dialysis. (  increase in creat may be low due to muscle wasting.)  Will attempt to confirm residual renal fx prior to placing permcath with 24 hour urine collection.  --Fluid and electrolytes stable at this point.  --MRSA bacteremia.  Continue abtx.    9/8  creat cont to increase pt states feels well  elevation in BUN may be due to prednisone as well  does not want catheter placement yet  no urgency for hd, reminded pt he cannot get permcath on sunday by IR    9/9 MK  - APRYL with slowly rising cr and the BUN elevation confounded with prednisone administration   - electrolytes stable  - continue to monitor the renal function over the weekend and will likely need IR placement of catheter, pt aware of plan.    accepting but diasppointed    9/10 MK  - APRYL with continued rising scr and BUn elevation confounded by prednisone administration  - stable renal paramenter, will continue to monitor since with UOP improving, INC torsemide  - pt aware that will assess daily for the placement of HD catheter.  - leukocytosis: likely steroid induced, will fu and monitor  dw dr avani abdullahi    9/11 SY  --Urine output is maintained but clearance is minimal with continuously increasing renal parameters.   Therefore, will need to resume dialysis prior to d/c back to NH.  Explained in detail to pt.  Will plan for permcath placement  in am and resume HD.  --Leukocytosis now stabilized with repeat blood cultures negative.    9/12 SY  --APRYL   --Appears still dialysis dependent.  Permcath placement and HD today.   Will maintain on BIW schedule.  --Leukocytosis somewhat variable but stable.  Continue abtx.  --D/c planning.    9/13 SY  --APRYL  / Dialysis dependent.  Continue BIW HD support.  --Fluid and electrolytes fairly stable  --Leukocytosis stable  --MRSA bacteremia   Continue Daptomycin.

## 2017-09-13 NOTE — PROGRESS NOTE ADULT - SUBJECTIVE AND OBJECTIVE BOX
NEPHROLOGY INTERVAL HPI/OVERNIGHT EVENTS:   SY  S/p Left IJ permcath placement.  No complications.  Post HD --Tolerated well.     SY  No acute events overnight.  On CPAP comfortably.     SY  No acute events noted over the weekend.  Repeat 24 hr urine with Cr cl of 6 cc/min.  Alert, no apparent distress    HPI:  This patient is a 67 year old male with PMH significant for:                     Hodgkings Disease treated with Radiation and RT                     CAD - s/p cath in , cabg x 4                     chronic pleural effusion Right chest(s/p VATS )                     aspiration Pneumonia ,                      ESRD on Dialysis since                      Rash to vanco, or zoloft on steroid taper                     chronic vent dependence                     hypothyroidism  Yesterday nursing home developed increased HR with cyanosis sat ok  In ED there was high peak pressures, trach may have been positional but wbc has increased to 30, was 14 at [facility]..  Admitted for evaluation, needs HD today.  Initially refused, then agreed to 3 hr hd       PAST MEDICAL & SURGICAL HISTORY:  Hypothyroidism  ESRD on hemodialysis  Angina pectoris  Hodgkin's lymphoma  Tracheostomy dependent  Anemia      MEDICATIONS  (STANDING):  metoprolol 12.5 milliGRAM(s) Oral two times a day  levothyroxine 150 MICROGram(s) Oral daily  heparin  Injectable 5000 Unit(s) SubCutaneous every 12 hours  predniSONE   Tablet 50 milliGRAM(s) Oral daily  DAPTOmycin IVPB 460 milliGRAM(s) IV Intermittent every 48 hours  DAPTOmycin IVPB   IV Intermittent   omeprazole Suspension 40 milliGRAM(s) Oral daily  chlorhexidine 0.12% Liquid 5 milliLiter(s) Swish and Spit two times a day  multivitamin   Solution 5 milliLiter(s) Oral daily  torsemide 40 milliGRAM(s) Oral two times a day  epoetin ramón Injectable 4000 Unit(s) IV Push <User Schedule>    MEDICATIONS  (PRN):  diphenhydrAMINE   Injectable 25 milliGRAM(s) IV Push every 6 hours PRN Rash and/or Itching  ALPRAZolam 0.5 milliGRAM(s) Oral at bedtime PRN sleeep  oxyCODONE    5 mG/acetaminophen 325 mG 1 Tablet(s) Oral every 6 hours PRN Moderate Pain (4 - 6)          Vital Signs Last 24 Hrs  T(C): 36.5 (13 Sep 2017 09:20), Max: 36.7 (12 Sep 2017 16:35)  T(F): 97.7 (13 Sep 2017 09:20), Max: 98.1 (12 Sep 2017 20:47)  HR: 88 (13 Sep 2017 10:17) (85 - 113)  BP: 106/65 (13 Sep 2017 09:50) (88/48 - 107/67)  BP(mean): 74 (13 Sep 2017 09:50) (66 - 79)  RR: 21 (13 Sep 2017 09:50) (4 - 28)  SpO2: 99% (13 Sep 2017 09:50) (99% - 99%)  Daily     Daily Weight in k (13 Sep 2017 02:00)     @ 07:  -   @ 07:00  --------------------------------------------------------  IN: 1290 mL / OUT: 1700 mL / NET: -410 mL     @ 07:  -   @ 11:02  --------------------------------------------------------  IN: 0 mL / OUT: 200 mL / NET: -200 mL        PHYSICAL EXAM:  Alert and appropriate  GENERAL: no apparent distress  CHEST/LUNG: bilateral rhonchi  HEART: S1S2 RRR  ABDOMEN: soft  EXTREMITIES: positive edema  SKIN:     LABS:                        9.5    11.7  )-----------( 391      ( 12 Sep 2017 05:50 )             29.4         136  |  91<L>  |  127<H>  ----------------------------<  77  4.7   |  34<H>  |  3.72<H>    Ca    9.1      12 Sep 2017 05:50      PT/INR - ( 12 Sep 2017 05:50 )   PT: 13.2 sec;   INR: 1.22 ratio         PTT - ( 12 Sep 2017 05:50 )  PTT:27.6 sec            RADIOLOGY & ADDITIONAL TESTS:

## 2017-09-13 NOTE — CHART NOTE - NSCHARTNOTEFT_GEN_A_CORE
Surgery:    Pt had R IJ removed on 9/2 and 4 sutures placed over two small incisions.  Incisions clean and dry, well healed, no erythema or edema.  Sutures removed.  No bleeding noted.  Light dressing applied.  Pt tolerated well.

## 2017-09-14 RX ORDER — ALBUTEROL 90 UG/1
2 AEROSOL, METERED ORAL EVERY 6 HOURS
Qty: 0 | Refills: 0 | Status: DISCONTINUED | OUTPATIENT
Start: 2017-09-14 | End: 2017-09-15

## 2017-09-14 RX ORDER — IPRATROPIUM BROMIDE 0.2 MG/ML
1 SOLUTION, NON-ORAL INHALATION EVERY 4 HOURS
Qty: 0 | Refills: 0 | Status: DISCONTINUED | OUTPATIENT
Start: 2017-09-14 | End: 2017-09-15

## 2017-09-14 RX ADMIN — Medication 50 MILLIGRAM(S): at 06:48

## 2017-09-14 RX ADMIN — Medication 150 MICROGRAM(S): at 06:48

## 2017-09-14 RX ADMIN — Medication 12.5 MILLIGRAM(S): at 17:58

## 2017-09-14 RX ADMIN — Medication 5 MILLILITER(S): at 14:54

## 2017-09-14 RX ADMIN — Medication 0.5 MILLIGRAM(S): at 23:19

## 2017-09-14 RX ADMIN — Medication 40 MILLIGRAM(S): at 06:48

## 2017-09-14 RX ADMIN — Medication 12.5 MILLIGRAM(S): at 06:48

## 2017-09-14 RX ADMIN — Medication 1 PUFF(S): at 23:56

## 2017-09-14 RX ADMIN — OMEPRAZOLE 40 MILLIGRAM(S): 10 CAPSULE, DELAYED RELEASE ORAL at 14:54

## 2017-09-14 RX ADMIN — Medication 40 MILLIGRAM(S): at 17:58

## 2017-09-14 RX ADMIN — CHLORHEXIDINE GLUCONATE 5 MILLILITER(S): 213 SOLUTION TOPICAL at 06:48

## 2017-09-14 RX ADMIN — CHLORHEXIDINE GLUCONATE 5 MILLILITER(S): 213 SOLUTION TOPICAL at 17:58

## 2017-09-14 NOTE — CHART NOTE - NSCHARTNOTEFT_GEN_A_CORE
Suggest change TF to Oxepa     Rate 70 ml/hr x 24 hr    Provides 2520 kcal                105 g pro                1318 cc H2O      Meets pt's needs for kcal, pro, H2O     100%

## 2017-09-14 NOTE — PROGRESS NOTE ADULT - SUBJECTIVE AND OBJECTIVE BOX
NEPHROLOGY INTERVAL HPI/OVERNIGHT EVENTS:   SY  No acute events overnight.  Resting fairly comfortably on CPAP.     SY  S/p Left IJ permcath placement.  No complications.  Post HD --Tolerated well.     SY  No acute events overnight.  On CPAP comfortably.     SY  No acute events noted over the weekend.  Repeat 24 hr urine with Cr cl of 6 cc/min.  Alert, no apparent distress    HPI:  This patient is a 67 year old male with PMH significant for:                     Hodgkings Disease treated with Radiation and RT                     CAD - s/p cath in , cabg x 4                     chronic pleural effusion Right chest(s/p VATS )                     aspiration Pneumonia ,                      ESRD on Dialysis since 2016                     Rash to vanco, or zoloft on steroid taper                     chronic vent dependence                     hypothyroidism  Yesterday nursing home developed increased HR with cyanosis sat ok  In ED there was high peak pressures, trach may have been positional but wbc has increased to 30, was 14 at [facility]..  Admitted for evaluation, needs HD today.  Initially refused, then agreed to 3 hr hd       PAST MEDICAL & SURGICAL HISTORY:  Hypothyroidism  ESRD on hemodialysis  Angina pectoris  Hodgkin's lymphoma  Tracheostomy dependent  Anemia  MEDICATIONS  (STANDING):  metoprolol 12.5 milliGRAM(s) Oral two times a day  levothyroxine 150 MICROGram(s) Oral daily  heparin  Injectable 5000 Unit(s) SubCutaneous every 12 hours  predniSONE   Tablet 50 milliGRAM(s) Oral daily  DAPTOmycin IVPB 460 milliGRAM(s) IV Intermittent every 48 hours  DAPTOmycin IVPB   IV Intermittent   omeprazole Suspension 40 milliGRAM(s) Oral daily  chlorhexidine 0.12% Liquid 5 milliLiter(s) Swish and Spit two times a day  multivitamin   Solution 5 milliLiter(s) Oral daily  torsemide 40 milliGRAM(s) Oral two times a day  epoetin ramón Injectable 4000 Unit(s) IV Push <User Schedule>    MEDICATIONS  (PRN):  diphenhydrAMINE   Injectable 25 milliGRAM(s) IV Push every 6 hours PRN Rash and/or Itching  ALPRAZolam 0.5 milliGRAM(s) Oral at bedtime PRN sleeep  oxyCODONE    5 mG/acetaminophen 325 mG 1 Tablet(s) Oral every 6 hours PRN Moderate Pain (4 - 6)  polyethylene glycol 3350 17 Gram(s) Oral daily PRN Constipation          Vital Signs Last 24 Hrs  T(C): 36.1 (14 Sep 2017 07:30), Max: 36.5 (14 Sep 2017 00:00)  T(F): 97 (14 Sep 2017 07:30), Max: 97.7 (14 Sep 2017 00:00)  HR: 95 (14 Sep 2017 09:00) (82 - 111)  BP: 104/67 (14 Sep 2017 09:00) (101/59 - 112/70)  BP(mean): 143 (14 Sep 2017 06:00) (67 - 143)  RR: 30 (14 Sep 2017 09:00) (8 - 31)  SpO2: 100% (14 Sep 2017 06:00) (99% - 100%)  Daily     Daily Weight in k (14 Sep 2017 01:54)    13 @ 07:01  -   @ 07:00  --------------------------------------------------------  IN: 850 mL / OUT: 1200 mL / NET: -350 mL        PHYSICAL EXAM: Alert and appropriate  GENERAL: No apparent distress  CHEST/LUNG: Bilateral rhonchi  HEART: S1S2 RRR  ABDOMEN: soft  EXTREMITIES: stable edema  SKIN:     LABS:                      RADIOLOGY & ADDITIONAL TESTS:

## 2017-09-14 NOTE — PROGRESS NOTE ADULT - SUBJECTIVE AND OBJECTIVE BOX
CC:  Sepsis    HPI:    67 y/o male with Chronic resp failure s/p trach and PEG, CAD s/p PCI and CABG, HL s/p RT and hypothyroid admitted with MRSA sepsis.      9/11:  pt seen and examined in CCU. On SBT. No access for HD at this time. Pt would like to return to [facility]    9/12: above noted. For tunneled cath today followed by HD.  No other events overnight    9/13: S/P tunneled cath placement and HD on 9/12.  ID input appreciated. stable for tx to [facility] if bed available     9/14: no events overnight. await for bed      PMH:  As above.     PSH:  As above.     FH: Non Contributory other than those listed in HPI    Social History:  As above    MEDICATIONS  (STANDING):  metoprolol 12.5 milliGRAM(s) Oral two times a day  levothyroxine 150 MICROGram(s) Oral daily  heparin  Injectable 5000 Unit(s) SubCutaneous every 12 hours  predniSONE   Tablet 50 milliGRAM(s) Oral daily  DAPTOmycin IVPB 460 milliGRAM(s) IV Intermittent every 48 hours  DAPTOmycin IVPB   IV Intermittent   omeprazole Suspension 40 milliGRAM(s) Oral daily  chlorhexidine 0.12% Liquid 5 milliLiter(s) Swish and Spit two times a day  multivitamin   Solution 5 milliLiter(s) Oral daily  torsemide 40 milliGRAM(s) Oral two times a day  epoetin ramón Injectable 4000 Unit(s) IV Push <User Schedule>    MEDICATIONS  (PRN):  diphenhydrAMINE   Injectable 25 milliGRAM(s) IV Push every 6 hours PRN Rash and/or Itching  ALPRAZolam 0.5 milliGRAM(s) Oral at bedtime PRN sleeep  oxyCODONE    5 mG/acetaminophen 325 mG 1 Tablet(s) Oral every 6 hours PRN Moderate Pain (4 - 6)  polyethylene glycol 3350 17 Gram(s) Oral daily PRN Constipation      Allergies: NKDA    ROS:  SEE BELOW        ICU Vital Signs Last 24 Hrs  T(C): 36.4 (14 Sep 2017 05:00), Max: 36.5 (13 Sep 2017 09:20)  T(F): 97.6 (14 Sep 2017 05:00), Max: 97.7 (13 Sep 2017 09:20)  HR: 93 (14 Sep 2017 08:00) (82 - 111)  BP: 103/50 (14 Sep 2017 06:00) (101/59 - 112/70)  BP(mean): 143 (14 Sep 2017 06:00) (67 - 143)  ABP: --  ABP(mean): --  RR: 8 (14 Sep 2017 08:00) (8 - 31)  SpO2: 100% (14 Sep 2017 06:00) (99% - 100%)      Mode: AC/ CMV (Assist Control/ Continuous Mandatory Ventilation)  RR (machine): 14  TV (machine): 400  FiO2: 40  PEEP: 5  ITime: 1  PIP: 33      I&O's Summary    13 Sep 2017 07:01  -  14 Sep 2017 07:00  --------------------------------------------------------  IN: 850 mL / OUT: 1200 mL / NET: -350 mL        Physical Exam:  SEE BELOW                              DVT Prophylaxis:                                                            Contraindication:     Advanced Directives:    Discussed with:    Visit Information:  Time spent excluding procedure:      ** Time is exclusive of billed procedures and/or teaching and/or routine family updates.

## 2017-09-14 NOTE — PROGRESS NOTE ADULT - ASSESSMENT
This patient is a 67 year old male with PMH significant for: Hodgkings Disease treated with Radiation, CAD - s/p cath in 2009, cabg x 4, chronic pleural effusion Right chest, aspiration Pneumonia 2016, ESRD on Dialysis since 2016, Rash to vanco, or zoloft on steroid taper, chronic vent dependence, hypothyroidism..  Yesterday nursing home developed increased HR with cyanosis sat ok  In ED there was high peak pressures, trach may have been positional but wbc has increased to 30, was 14 at [facility]..  Admitted for evaluation, needs HD today.  Initially refused, then agreed to 3 hr hd   Medical management as per ICU  EPO as per outpt orders    9/1 addendum 3:15 pm  seen on hd doing well    9/2 SY  --Pt with APRYL on HD since 5/2017.  Unclear if renal recovery can be expected.   Pt's pre HD creat was only 3.1.  Now found with positive blood culture with gram positive cocci.   Most likely line sepsis.  Will remove catheter today.  Will continue to monitor for any renal recovery.  Limit free water intake to prevent hyponatremia.  Continue diuretics.    9/3 SY  --Permcath removed due to MRSA bacteremia.  Pt on HD since 5/2017 due to ATN /Sepsis.  Unclear if enough renal recovery.  Will follow trend for next 2-3 days to determine residual renal fx.  --Continue abtx for bacteremia.    9/4 SY  --MRSA Bacteremia.  Continue ABTX.  --Creat continues to slowly rise.   Again unclear if pt has enough residula fx to remain off dialysis.  Continue to assess daily.  --If creat continues to rise, then will plan Permcath placement in 2-3 days.     9/5  MRSA bacteremia, on Cubicin  - last hd friday, catheter removed due to MRSA, stable at this time  awake alert no new issues  wants to hold off HD, thinks has adequate renal function to go back to rehab at [place] near his house, off dialysis  d/w Dr Lamont Abdullahi, agrees to stay for now    9/6 SY  --APRYL on HD, last TX on 9/1  Renal parameters continue to slowly increase,  therefore, even with adequate urine volume, pt most anjelicaley will need to restart HD   Will monitor for another 1-2 days.  Pt now agreeable with plan for possible permcath placement on 9/8. Friday.  --MRSA bacteremia --continue abtx.    9/7 SY  --APRYL on temporary HD support.  Last tx on 9/1.  Urine output has been adequate.   Unclear if renal recovery may be adequate to remain off dialysis. (  increase in creat may be low due to muscle wasting.)  Will attempt to confirm residual renal fx prior to placing permcath with 24 hour urine collection.  --Fluid and electrolytes stable at this point.  --MRSA bacteremia.  Continue abtx.    9/8  creat cont to increase pt states feels well  elevation in BUN may be due to prednisone as well  does not want catheter placement yet  no urgency for hd, reminded pt he cannot get permcath on sunday by IR    9/9 MK  - APRYL with slowly rising cr and the BUN elevation confounded with prednisone administration   - electrolytes stable  - continue to monitor the renal function over the weekend and will likely need IR placement of catheter, pt aware of plan.    accepting but diasppointed    9/10 MK  - APRYL with continued rising scr and BUn elevation confounded by prednisone administration  - stable renal paramenter, will continue to monitor since with UOP improving, INC torsemide  - pt aware that will assess daily for the placement of HD catheter.  - leukocytosis: likely steroid induced, will fu and monitor  dw dr avani abdullahi    9/11 SY  --Urine output is maintained but clearance is minimal with continuously increasing renal parameters.   Therefore, will need to resume dialysis prior to d/c back to NH.  Explained in detail to pt.  Will plan for permcath placement  in am and resume HD.  --Leukocytosis now stabilized with repeat blood cultures negative.    9/12 SY  --APRYL   --Appears still dialysis dependent.  Permcath placement and HD today.   Will maintain on BIW schedule.  --Leukocytosis somewhat variable but stable.  Continue abtx.  --D/c planning.    9/13 SY  --APRYL  / Dialysis dependent.  Continue BIW HD support.  --Fluid and electrolytes fairly stable  --Leukocytosis stable  --MRSA bacteremia   Continue Daptomycin.    9/14 SY  --Dialysis dependent APRYL   Continue BIW HD support  --Resp status improved.   Continue vent support with weaning trial  --Follow up Leukocytosis. Continue Daptomycin.

## 2017-09-15 ENCOUNTER — TRANSCRIPTION ENCOUNTER (OUTPATIENT)
Age: 67
End: 2017-09-15

## 2017-09-15 VITALS — OXYGEN SATURATION: 98 %

## 2017-09-15 LAB
ALBUMIN SERPL ELPH-MCNC: 2.3 G/DL — LOW (ref 3.3–5)
ANION GAP SERPL CALC-SCNC: 10 MMOL/L — SIGNIFICANT CHANGE UP (ref 5–17)
BUN SERPL-MCNC: 97 MG/DL — HIGH (ref 7–23)
CALCIUM SERPL-MCNC: 8.7 MG/DL — SIGNIFICANT CHANGE UP (ref 8.5–10.1)
CHLORIDE SERPL-SCNC: 91 MMOL/L — LOW (ref 96–108)
CO2 SERPL-SCNC: 34 MMOL/L — HIGH (ref 22–31)
CREAT SERPL-MCNC: 2.99 MG/DL — HIGH (ref 0.5–1.3)
GLUCOSE SERPL-MCNC: 83 MG/DL — SIGNIFICANT CHANGE UP (ref 70–99)
HCT VFR BLD CALC: 29.4 % — LOW (ref 39–50)
HGB BLD-MCNC: 9.6 G/DL — LOW (ref 13–17)
MCHC RBC-ENTMCNC: 32.8 GM/DL — SIGNIFICANT CHANGE UP (ref 32–36)
MCHC RBC-ENTMCNC: 33.2 PG — SIGNIFICANT CHANGE UP (ref 27–34)
MCV RBC AUTO: 101.5 FL — HIGH (ref 80–100)
PHOSPHATE SERPL-MCNC: 5.9 MG/DL — HIGH (ref 2.5–4.5)
PLATELET # BLD AUTO: 347 K/UL — SIGNIFICANT CHANGE UP (ref 150–400)
POTASSIUM SERPL-MCNC: 4.3 MMOL/L — SIGNIFICANT CHANGE UP (ref 3.5–5.3)
POTASSIUM SERPL-SCNC: 4.3 MMOL/L — SIGNIFICANT CHANGE UP (ref 3.5–5.3)
RBC # BLD: 2.89 M/UL — LOW (ref 4.2–5.8)
RBC # FLD: 17 % — HIGH (ref 10.3–14.5)
SODIUM SERPL-SCNC: 135 MMOL/L — SIGNIFICANT CHANGE UP (ref 135–145)
WBC # BLD: 11.1 K/UL — HIGH (ref 3.8–10.5)
WBC # FLD AUTO: 11.1 K/UL — HIGH (ref 3.8–10.5)

## 2017-09-15 RX ORDER — DIPHENHYDRAMINE HCL 50 MG
25 CAPSULE ORAL
Qty: 0 | Refills: 0 | COMMUNITY
Start: 2017-09-15

## 2017-09-15 RX ORDER — METOPROLOL TARTRATE 50 MG
12.5 TABLET ORAL
Qty: 0 | Refills: 0 | COMMUNITY

## 2017-09-15 RX ORDER — METOPROLOL TARTRATE 50 MG
25 TABLET ORAL
Qty: 0 | Refills: 0 | COMMUNITY
Start: 2017-09-15

## 2017-09-15 RX ORDER — ALPRAZOLAM 0.25 MG
0 TABLET ORAL
Qty: 0 | Refills: 0 | COMMUNITY

## 2017-09-15 RX ORDER — HEPARIN SODIUM 5000 [USP'U]/ML
5000 INJECTION INTRAVENOUS; SUBCUTANEOUS
Qty: 0 | Refills: 0 | COMMUNITY
Start: 2017-09-15

## 2017-09-15 RX ORDER — METOPROLOL TARTRATE 50 MG
12.5 TABLET ORAL
Qty: 0 | Refills: 0 | COMMUNITY
Start: 2017-09-15

## 2017-09-15 RX ORDER — POLYETHYLENE GLYCOL 3350 17 G/17G
17 POWDER, FOR SOLUTION ORAL
Qty: 0 | Refills: 0 | COMMUNITY
Start: 2017-09-15

## 2017-09-15 RX ORDER — ALBUTEROL 90 UG/1
2 AEROSOL, METERED ORAL
Qty: 0 | Refills: 0 | COMMUNITY
Start: 2017-09-15

## 2017-09-15 RX ORDER — LEVOTHYROXINE SODIUM 125 MCG
0 TABLET ORAL
Qty: 0 | Refills: 0 | COMMUNITY

## 2017-09-15 RX ORDER — ERYTHROPOIETIN 10000 [IU]/ML
4000 INJECTION, SOLUTION INTRAVENOUS; SUBCUTANEOUS
Qty: 0 | Refills: 0 | COMMUNITY
Start: 2017-09-15

## 2017-09-15 RX ORDER — LEVOTHYROXINE SODIUM 125 MCG
1 TABLET ORAL
Qty: 0 | Refills: 0 | COMMUNITY
Start: 2017-09-15

## 2017-09-15 RX ORDER — DAPTOMYCIN 500 MG/10ML
480 INJECTION, POWDER, LYOPHILIZED, FOR SOLUTION INTRAVENOUS
Qty: 0 | Refills: 0 | COMMUNITY
Start: 2017-09-15

## 2017-09-15 RX ORDER — IPRATROPIUM BROMIDE 0.2 MG/ML
1 SOLUTION, NON-ORAL INHALATION
Qty: 0 | Refills: 0 | COMMUNITY
Start: 2017-09-15

## 2017-09-15 RX ORDER — OMEPRAZOLE 10 MG/1
1 CAPSULE, DELAYED RELEASE ORAL
Qty: 0 | Refills: 0 | COMMUNITY

## 2017-09-15 RX ORDER — ALPRAZOLAM 0.25 MG
1 TABLET ORAL
Qty: 0 | Refills: 0 | COMMUNITY
Start: 2017-09-15

## 2017-09-15 RX ADMIN — Medication 50 MILLIGRAM(S): at 06:50

## 2017-09-15 RX ADMIN — OMEPRAZOLE 40 MILLIGRAM(S): 10 CAPSULE, DELAYED RELEASE ORAL at 15:20

## 2017-09-15 RX ADMIN — ERYTHROPOIETIN 4000 UNIT(S): 10000 INJECTION, SOLUTION INTRAVENOUS; SUBCUTANEOUS at 13:29

## 2017-09-15 RX ADMIN — CHLORHEXIDINE GLUCONATE 5 MILLILITER(S): 213 SOLUTION TOPICAL at 06:50

## 2017-09-15 RX ADMIN — CHLORHEXIDINE GLUCONATE 5 MILLILITER(S): 213 SOLUTION TOPICAL at 15:20

## 2017-09-15 RX ADMIN — Medication 40 MILLIGRAM(S): at 06:50

## 2017-09-15 RX ADMIN — Medication 150 MICROGRAM(S): at 06:50

## 2017-09-15 RX ADMIN — DAPTOMYCIN 118.4 MILLIGRAM(S): 500 INJECTION, POWDER, LYOPHILIZED, FOR SOLUTION INTRAVENOUS at 15:19

## 2017-09-15 RX ADMIN — Medication 1 PUFF(S): at 16:52

## 2017-09-15 RX ADMIN — Medication 12.5 MILLIGRAM(S): at 06:50

## 2017-09-15 RX ADMIN — Medication 1 PUFF(S): at 08:38

## 2017-09-15 RX ADMIN — Medication 5 MILLILITER(S): at 15:21

## 2017-09-15 RX ADMIN — ALBUTEROL 2 PUFF(S): 90 AEROSOL, METERED ORAL at 08:41

## 2017-09-15 RX ADMIN — Medication 1 PUFF(S): at 12:02

## 2017-09-15 NOTE — DISCHARGE NOTE ADULT - PLAN OF CARE
Wean vent as tolerated No active Rx Cont with synthroid Cont with dapto 480mg IV after each HD until 9/30.  Check CBC, CRP, ESR CMP and CPK weekly HD Tube feeds

## 2017-09-15 NOTE — PROGRESS NOTE ADULT - COMMENTS
ROS o/w negative
ALL other ROS are negative.

## 2017-09-15 NOTE — PROGRESS NOTE ADULT - EXTREMITIES
No cyanosis, clubbing or edema
detailed exam

## 2017-09-15 NOTE — DISCHARGE NOTE ADULT - CARE PLAN
Principal Discharge DX:	Sepsis due to methicillin resistant Staphylococcus aureus (MRSA)  Goal:	Cont with dapto 480mg IV after each HD until 9/30.  Check CBC, CRP, ESR CMP and CPK weekly  Secondary Diagnosis:	ESRD on hemodialysis  Goal:	HD  Secondary Diagnosis:	H/O tracheostomy  Goal:	Wean vent as tolerated  Secondary Diagnosis:	Hodgkin lymphoma, unspecified Hodgkin lymphoma type, unspecified body region  Goal:	No active Rx  Secondary Diagnosis:	Other specified hypothyroidism  Goal:	Cont with synthroid Principal Discharge DX:	Sepsis due to methicillin resistant Staphylococcus aureus (MRSA)  Goal:	Cont with dapto 480mg IV after each HD until 9/30.  Check CBC, CRP, ESR CMP and CPK weekly  Instructions for follow-up, activity and diet:	Tube feeds  Secondary Diagnosis:	ESRD on hemodialysis  Goal:	HD  Secondary Diagnosis:	H/O tracheostomy  Goal:	Wean vent as tolerated  Secondary Diagnosis:	Hodgkin lymphoma, unspecified Hodgkin lymphoma type, unspecified body region  Goal:	No active Rx  Secondary Diagnosis:	Other specified hypothyroidism  Goal:	Cont with synthroid

## 2017-09-15 NOTE — PROGRESS NOTE ADULT - ASSESSMENT
This patient is a 67 year old male with PMH significant for: Hodgkings Disease treated with Radiation, CAD - s/p cath in 2009, cabg x 4, chronic pleural effusion Right chest, aspiration Pneumonia 2016, ESRD on Dialysis since 2016, Rash to vanco, or zoloft on steroid taper, chronic vent dependence, hypothyroidism..  Yesterday nursing home developed increased HR with cyanosis sat ok  In ED there was high peak pressures, trach may have been positional but wbc has increased to 30, was 14 at [facility]..  Admitted for evaluation, needs HD today.  Initially refused, then agreed to 3 hr hd   Medical management as per ICU  EPO as per outpt orders    9/1 addendum 3:15 pm  seen on hd doing well    9/2 SY  --Pt with APRYL on HD since 5/2017.  Unclear if renal recovery can be expected.   Pt's pre HD creat was only 3.1.  Now found with positive blood culture with gram positive cocci.   Most likely line sepsis.  Will remove catheter today.  Will continue to monitor for any renal recovery.  Limit free water intake to prevent hyponatremia.  Continue diuretics.    9/3 SY  --Permcath removed due to MRSA bacteremia.  Pt on HD since 5/2017 due to ATN /Sepsis.  Unclear if enough renal recovery.  Will follow trend for next 2-3 days to determine residual renal fx.  --Continue abtx for bacteremia.    9/4 SY  --MRSA Bacteremia.  Continue ABTX.  --Creat continues to slowly rise.   Again unclear if pt has enough residula fx to remain off dialysis.  Continue to assess daily.  --If creat continues to rise, then will plan Permcath placement in 2-3 days.     9/5  MRSA bacteremia, on Cubicin  - last hd friday, catheter removed due to MRSA, stable at this time  awake alert no new issues  wants to hold off HD, thinks has adequate renal function to go back to rehab at [place] near his house, off dialysis  d/w Dr Lamont Abdullahi, agrees to stay for now    9/6 SY  --APRYL on HD, last TX on 9/1  Renal parameters continue to slowly increase,  therefore, even with adequate urine volume, pt most anjelicaley will need to restart HD   Will monitor for another 1-2 days.  Pt now agreeable with plan for possible permcath placement on 9/8. Friday.  --MRSA bacteremia --continue abtx.    9/7 SY  --APRYL on temporary HD support.  Last tx on 9/1.  Urine output has been adequate.   Unclear if renal recovery may be adequate to remain off dialysis. (  increase in creat may be low due to muscle wasting.)  Will attempt to confirm residual renal fx prior to placing permcath with 24 hour urine collection.  --Fluid and electrolytes stable at this point.  --MRSA bacteremia.  Continue abtx.    9/8  creat cont to increase pt states feels well  elevation in BUN may be due to prednisone as well  does not want catheter placement yet  no urgency for hd, reminded pt he cannot get permcath on sunday by IR    9/9 MK  - APRYL with slowly rising cr and the BUN elevation confounded with prednisone administration   - electrolytes stable  - continue to monitor the renal function over the weekend and will likely need IR placement of catheter, pt aware of plan.    accepting but diasppointed    9/10 MK  - APRYL with continued rising scr and BUn elevation confounded by prednisone administration  - stable renal paramenter, will continue to monitor since with UOP improving, INC torsemide  - pt aware that will assess daily for the placement of HD catheter.  - leukocytosis: likely steroid induced, will fu and monitor  dw dr avani abdullahi    9/11 SY  --Urine output is maintained but clearance is minimal with continuously increasing renal parameters.   Therefore, will need to resume dialysis prior to d/c back to NH.  Explained in detail to pt.  Will plan for permcath placement  in am and resume HD.  --Leukocytosis now stabilized with repeat blood cultures negative.    9/12 SY  --APRYL   --Appears still dialysis dependent.  Permcath placement and HD today.   Will maintain on BIW schedule.  --Leukocytosis somewhat variable but stable.  Continue abtx.  --D/c planning.    9/13 SY  --APRYL  / Dialysis dependent.  Continue BIW HD support.  --Fluid and electrolytes fairly stable  --Leukocytosis stable  --MRSA bacteremia   Continue Daptomycin.    9/14 SY  --Dialysis dependent APRYL   Continue BIW HD support  --Resp status improved.   Continue vent support with weaning trial  --Follow up Leukocytosis. Continue Daptomycin.    9/15 MK  - APRYL and remains HD dependent.  not tolerating high UF goal.  will attempt 850 ml  dc planning in progress

## 2017-09-15 NOTE — DISCHARGE NOTE ADULT - MEDICATION SUMMARY - MEDICATIONS TO TAKE
I will START or STAY ON the medications listed below when I get home from the hospital:    predniSONE 50 mg oral tablet  -- 1 tab(s) by mouth once a day  -- Indication: For Allergy    oxyCODONE-acetaminophen 5 mg-325 mg oral tablet  -- 1 tab(s) by mouth every 6 hours, As needed, Moderate Pain (4 - 6)  -- Indication: For Pain    heparin  -- 5000 unit(s) subcutaneous every 12 hours  -- Indication: For DVT prophy    diphenhydrAMINE 50 mg/mL injectable solution  -- 25 milligram(s) injectable every 6 hours, As Needed  -- Indication: For Allergy    ALPRAZolam 0.5 mg oral tablet  -- 1 tab(s) by mouth once a day (at bedtime), As needed, sleeep  -- Indication: For For sleep    metoprolol  -- 12.5 milligram(s) by gastrostomy tube 2 times a day  -- Indication: For HTN    albuterol 90 mcg/inh inhalation aerosol  -- 2 puff(s) inhaled every 6 hours, As needed, Bronchospasm  -- Indication: For Lung disease    ipratropium CFC free 17 mcg/inh inhalation aerosol  -- 1 puff(s) inhaled every 4 hours  -- Indication: For Lung disease    torsemide 20 mg oral tablet  -- 2 tab(s) by mouth 2 times a day  -- Indication: For Edema    epoetin ramón  -- 4000 unit(s) intravenous once a week  -- Indication: For ESRD on hemodialysis    polyethylene glycol 3350 oral powder for reconstitution  -- 17 gram(s) by mouth once a day, As needed, Constipation  -- Indication: For Constipation    DAPTOmycin 500 mg intravenous injection  -- 480 milligram(s) intravenous Monday, Wednesday, and Friday  -- Indication: For Sepsis    levothyroxine 150 mcg (0.15 mg) oral tablet  -- 1 tab(s) by mouth once a day  -- Indication: For Thyroid disease    Multiple Vitamins oral liquid  -- 5 milliliter(s) by gastrostomy tube once a day  -- Indication: For Vits

## 2017-09-15 NOTE — DISCHARGE NOTE ADULT - MEDICATION SUMMARY - MEDICATIONS TO STOP TAKING
I will STOP taking the medications listed below when I get home from the hospital:    Xanax 0.25 mg oral tablet  --  by mouth every 6 hours, As Needed

## 2017-09-15 NOTE — PROGRESS NOTE ADULT - ASSESSMENT
IMP:    MRSA sepsis on dapto 480mg IV q 48 following HD  ESRD   Chronic resp failure s/p trach and PEG  Hypothyroid  Medication allergy resulting in diffuse rash    Plan:    IV abx until 9/30  PSV as [facility]ark  Steroids tapering (slow tapering)  TF to goal   PT  DVT prophy--SCD and sqhep    Tx to [facility] when bed available--d/w CCU staff and pt.

## 2017-09-15 NOTE — DISCHARGE NOTE ADULT - PATIENT PORTAL LINK FT
“You can access the [employer] Patient Portal, offered by [facility], by registering with the following website: [email or web address]”

## 2017-09-15 NOTE — PROGRESS NOTE ADULT - RS GEN PE MLT RESP DETAILS PC
rhonchi
airway patent/breath sounds equal/good air movement/respirations non-labored

## 2017-09-15 NOTE — PROGRESS NOTE ADULT - PROVIDER SPECIALTY LIST ADULT
Critical Care
Infectious Disease
Nephrology
Surgery
Critical Care
Infectious Disease

## 2017-09-15 NOTE — DISCHARGE NOTE ADULT - MEDICATION SUMMARY - MEDICATIONS TO CHANGE
I will SWITCH the dose or number of times a day I take the medications listed below when I get home from the hospital:    predniSONE  -- 60 milligram(s) by mouth once a day

## 2017-09-15 NOTE — DISCHARGE NOTE ADULT - SECONDARY DIAGNOSIS.
H/O tracheostomy Hodgkin lymphoma, unspecified Hodgkin lymphoma type, unspecified body region Other specified hypothyroidism ESRD on hemodialysis

## 2017-09-15 NOTE — DISCHARGE NOTE ADULT - HOSPITAL COURSE
Pt admitted with MRSA sepsis secondary to line infection--tessio removed with resolution of bacteremia.  New cath placed on IR and HD restarted.   Pt is stable for tx to [facility] today

## 2017-09-15 NOTE — PROGRESS NOTE ADULT - SUBJECTIVE AND OBJECTIVE BOX
CC:  Sepsis    HPI:    65 y/o male with Chronic resp failure s/p trach and PEG, CAD s/p PCI and CABG, HL s/p RT and hypothyroid admitted with MRSA sepsis.      9/11:  pt seen and examined in CCU. On SBT. No access for HD at this time. Pt would like to return to [facility]    9/12: above noted. For tunneled cath today followed by HD.  No other events overnight    9/13: S/P tunneled cath placement and HD on 9/12.  ID input appreciated. stable for tx to [facility] if bed available     9/14: no events overnight. await for bed    9/15: no events overnight      PMH:  As above.     PSH:  As above.     FH: Non Contributory other than those listed in HPI    Social History:  As above    MEDICATIONS  (STANDING):  metoprolol 12.5 milliGRAM(s) Oral two times a day  levothyroxine 150 MICROGram(s) Oral daily  heparin  Injectable 5000 Unit(s) SubCutaneous every 12 hours  predniSONE   Tablet 50 milliGRAM(s) Oral daily  DAPTOmycin IVPB 460 milliGRAM(s) IV Intermittent every 48 hours  DAPTOmycin IVPB   IV Intermittent   omeprazole Suspension 40 milliGRAM(s) Oral daily  chlorhexidine 0.12% Liquid 5 milliLiter(s) Swish and Spit two times a day  multivitamin   Solution 5 milliLiter(s) Oral daily  torsemide 40 milliGRAM(s) Oral two times a day  epoetin ramón Injectable 4000 Unit(s) IV Push <User Schedule>  ipratropium 17 MICROgram(s) HFA Inhaler 1 Puff(s) Inhalation every 4 hours    MEDICATIONS  (PRN):  diphenhydrAMINE   Injectable 25 milliGRAM(s) IV Push every 6 hours PRN Rash and/or Itching  ALPRAZolam 0.5 milliGRAM(s) Oral at bedtime PRN sleeep  oxyCODONE    5 mG/acetaminophen 325 mG 1 Tablet(s) Oral every 6 hours PRN Moderate Pain (4 - 6)  polyethylene glycol 3350 17 Gram(s) Oral daily PRN Constipation  ALBUTerol    90 MICROgram(s) HFA Inhaler 2 Puff(s) Inhalation every 6 hours PRN Bronchospasm      Allergies: NKDA    ROS:  SEE BELOW        ICU Vital Signs Last 24 Hrs  T(C): 36.7 (15 Sep 2017 07:32), Max: 36.7 (15 Sep 2017 07:32)  T(F): 98 (15 Sep 2017 07:32), Max: 98 (15 Sep 2017 07:32)  HR: 94 (15 Sep 2017 07:32) (78 - 114)  BP: 112/78 (15 Sep 2017 06:00) (104/67 - 131/85)  BP(mean): 95 (14 Sep 2017 20:50) (95 - 95)  ABP: --  ABP(mean): --  RR: 10 (15 Sep 2017 07:32) (0 - 34)  SpO2: 100% (15 Sep 2017 02:11) (100% - 100%)      Mode: AC/ CMV (Assist Control/ Continuous Mandatory Ventilation)  RR (machine): 14  TV (machine): 400  FiO2: 40  PEEP: 5  ITime: 1  PIP: 27      I&O's Summary    14 Sep 2017 07:01  -  15 Sep 2017 07:00  --------------------------------------------------------  IN: 480 mL / OUT: 1700 mL / NET: -1220 mL        Physical Exam:  SEE BELOW                          9.6    11.1  )-----------( 347      ( 15 Sep 2017 05:30 )             29.4       09-15    135  |  91<L>  |  97<H>  ----------------------------<  83  4.3   |  34<H>  |  2.99<H>    Ca    8.7      15 Sep 2017 05:30  Phos  5.9     09-15    TPro  x   /  Alb  2.3<L>  /  TBili  x   /  DBili  x   /  AST  x   /  ALT  x   /  AlkPhos  x   09-15                    DVT Prophylaxis:                                                            Contraindication:     Advanced Directives:    Discussed with:    Visit Information:  Time spent excluding procedure:      ** Time is exclusive of billed procedures and/or teaching and/or routine family updates.

## 2017-09-19 DIAGNOSIS — I25.10 ATHEROSCLEROTIC HEART DISEASE OF NATIVE CORONARY ARTERY WITHOUT ANGINA PECTORIS: ICD-10-CM

## 2017-09-19 DIAGNOSIS — A41.02 SEPSIS DUE TO METHICILLIN RESISTANT STAPHYLOCOCCUS AUREUS: ICD-10-CM

## 2017-09-19 DIAGNOSIS — C81.90 HODGKIN LYMPHOMA, UNSPECIFIED, UNSPECIFIED SITE: ICD-10-CM

## 2017-09-19 DIAGNOSIS — T36.8X5A ADVERSE EFFECT OF OTHER SYSTEMIC ANTIBIOTICS, INITIAL ENCOUNTER: ICD-10-CM

## 2017-09-19 DIAGNOSIS — E03.9 HYPOTHYROIDISM, UNSPECIFIED: ICD-10-CM

## 2017-09-19 DIAGNOSIS — J15.212 PNEUMONIA DUE TO METHICILLIN RESISTANT STAPHYLOCOCCUS AUREUS: ICD-10-CM
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
## 2017-09-19 DIAGNOSIS — T80.211A BLOODSTREAM INFECTION DUE TO CENTRAL VENOUS CATHETER, INITIAL ENCOUNTER: ICD-10-CM

## 2017-09-19 DIAGNOSIS — I50.30 UNSPECIFIED DIASTOLIC (CONGESTIVE) HEART FAILURE: ICD-10-CM

## 2017-09-19 DIAGNOSIS — N17.9 ACUTE KIDNEY FAILURE, UNSPECIFIED: ICD-10-CM

## 2017-09-19 DIAGNOSIS — Z93.0 TRACHEOSTOMY STATUS: ICD-10-CM

## 2017-09-19 DIAGNOSIS — R06.02 SHORTNESS OF BREATH: ICD-10-CM

## 2017-09-19 DIAGNOSIS — L27.0 GENERALIZED SKIN ERUPTION DUE TO DRUGS AND MEDICAMENTS TAKEN INTERNALLY: ICD-10-CM

## 2017-09-19 DIAGNOSIS — Z99.11 DEPENDENCE ON RESPIRATOR [VENTILATOR] STATUS: ICD-10-CM

## 2017-09-19 DIAGNOSIS — Z99.2 DEPENDENCE ON RENAL DIALYSIS: ICD-10-CM

## 2017-09-19 DIAGNOSIS — T43.225A ADVERSE EFFECT OF SELECTIVE SEROTONIN REUPTAKE INHIBITORS, INITIAL ENCOUNTER: ICD-10-CM

## 2017-09-19 DIAGNOSIS — J96.10 CHRONIC RESPIRATORY FAILURE, UNSPECIFIED WHETHER WITH HYPOXIA OR HYPERCAPNIA: ICD-10-CM

## 2017-09-19 DIAGNOSIS — J94.8 OTHER SPECIFIED PLEURAL CONDITIONS: ICD-10-CM

## 2019-08-31 NOTE — PROGRESS NOTE ADULT - SUBJECTIVE AND OBJECTIVE BOX
This patient is a 67 year old male with PMH of Hodgkins disease s/p XRT/chemo, CAD s/p cath 2009, CABG X 4, chronic R pleural effusion s/p VATS 206, asp , ESRD on HD since May 2016, chronic resp failure s/p trach, vent dependent, hypothyroidism admitted 8/31 from NH where he developed tachycardia and cyanosis, was saturating normally, not hypoxic but was sent for further eval, here afebrile, wbc ct elevated to 30, CT chest showed moderate R loculated hydopneumothorax with pleural thickening/ L pleural effusion/pleural thickening, 4 bottles blood cx growing MRSA, + perm catheter in place for past 2 months, has vanco allergy, was given IV dapto/cefepime.                   no fevers  perm catheter removed 9/2  plan for perm-cath placement today  otherwise feels well  rash improving    MEDICATIONS  (STANDING):  metoprolol 12.5 milliGRAM(s) Oral two times a day  levothyroxine 150 MICROGram(s) Oral daily  heparin  Injectable 5000 Unit(s) SubCutaneous every 12 hours  predniSONE   Tablet 50 milliGRAM(s) Oral daily  DAPTOmycin IVPB 460 milliGRAM(s) IV Intermittent every 48 hours  DAPTOmycin IVPB   IV Intermittent   omeprazole Suspension 40 milliGRAM(s) Oral daily  chlorhexidine 0.12% Liquid 5 milliLiter(s) Swish and Spit two times a day  multivitamin   Solution 5 milliLiter(s) Oral daily  torsemide 40 milliGRAM(s) Oral two times a day      Vital Signs Last 24 Hrs  T(C): 36.6 (12 Sep 2017 05:00), Max: 36.6 (12 Sep 2017 05:00)  T(F): 97.8 (12 Sep 2017 05:00), Max: 97.8 (12 Sep 2017 05:00)  HR: 93 (12 Sep 2017 05:13) (88 - 105)  BP: 105/67 (12 Sep 2017 10:00) (104/72 - 110/71)  BP(mean): 75 (12 Sep 2017 10:00) (75 - 80)  RR: 18 (11 Sep 2017 22:00) (0 - 28)  SpO2: --              PE:  Constitutional: frail looking, + trach   HEENT: NC/AT, EOMI, PERRLA  Neck: supple  Back: no tenderness  Respiratory: decreased breath sounds  Cardiovascular: S1S2 regular, no murmurs  Abdomen: soft, not tender, + gtube in place with some drainage  Genitourinary: deferred  Rectal: deferred  Musculoskeletal: no muscle tenderness, no joint swelling or tenderness  Extremities: no pedal edema  Neurological:  no focal deficits  Skin: erythematous rash on chest/trunk/worse along LE's blanching with desquamation now noted    Labs:                                              9.5    11.7  )-----------( 391      ( 12 Sep 2017 05:50 )             29.4     09-12    136  |  91<L>  |  127<H>  ----------------------------<  77  4.7   |  34<H>  |  3.72<H>    Ca    9.1      12 Sep 2017 05:50  Phos  4.6     09-11  Mg     2.1     09-11               Cultures:         Culture - Blood in AM (09.03.17 @ 05:56)    Specimen Source: .Blood None    Culture Results:   No growth to date.    Culture - Blood in AM (09.03.17 @ 06:00)    Specimen Source: .Blood None    Culture Results:   No growth to date.      Culture - Blood (08.31.17 @ 17:29)    -  Methicillin resistant Staphylococcus aureus (MRSA): Detec    -  Candida parapsilosis: Nondet    -  Coagulase negative Staphylococcus: Nondet    -  Enterobacter cloacae complex: Nondet    -  Enterococcus species: Nondet    -  Escherichia coli: Nondet    -  Haemophilus influenzae: Nondet    -  Neisseria meningitidis: Nondet    -  Proteus species: Nondet    -  Pseudomonas aeruginosa: Nondet    -  Streptococcus agalactiae (Group B): Nondet    -  Acinetobacter baumanii: Nondet    -  Candida albicans: Nondet    -  Serratia marcescens: Nondet    -  Streptococcus pneumoniae: Nondet    -  Vancomycin resistant Enterococcus sp.: Nondet    Gram Stain:   Growth in aerobic bottle: Gram Positive Cocci in Clusters  Growth in anaerobic bottle: Gram Positive Cocci in Clusters    -  Candida glabrata: Nondet    -  Candida krusei: Nondet    -  Candida tropicalis: Nondet    -  Klebsiella oxytoca: Nondet    -  Klebsiella pneumoniae: Nondet    -  Listeria monocytogenes: Nondet    -  Multidrug (KPC pos) resistant organism: Nondet    -  Staphylococcus aureus: Nondet    -  Streptococcus pyogenes (Group A): Nondet    -  Streptococcus sp. (Not Grp A, B or S pneumoniae): Nondet    Specimen Source: .Blood Blood    Organism: Blood Culture PCR    Culture Results:   Growth in aerobic and anaerobic bottles: Staphylococcus aureus  ***Blood Panel PCR results on this specimen are available  approximately 3 hours after the Gram stain result.***  Gram stain, PCR, and/or culture results may not always  correspond dueto difference in methodologies.    Organism Identification: Blood Culture PCR    Method Type: PCR    Culture - Sputum . (09.01.17 @ 12:45)    -  Ampicillin/Sulbactam: R <=8/4    -  Ciprofloxacin: R >2    -  Erythromycin: R >4    -  Penicillin: R 8    Gram Stain:   No squamous epithelial cells per low power field  No polymorphonuclear cells seen per low power field  Few Gram Negative Rods per oil power field  Few Gram Positive Cocci in Clusters per oil power field    -  Gentamicin: S <=1    -  Levofloxacin: S 2    -  Tetra/Doxy: S <=1    -  Ceftazidime: S 4    -  Clindamycin: R >4    -  Oxacillin: R >2    -  RIF- Rifampin: S <=1    -  Trimethoprim/Sulfamethoxazole: S <=0.5/9.5    -  Trimethoprim/Sulfamethoxazole: S <=0.5/9.5    -  Cefazolin: R >16    -  Levofloxacin: R >4    -  Linezolid: S 2    -  Moxifloxacin(Aerobic): R >4    -  Vancomycin: S 2    Specimen Source: .Sputum Sputum    Culture Results:   Moderate Methicillin resistant Staphylococcus aureus  Numerous Stenotrophomonas maltophilia  Normal Respiratory Ekaterina absent    Organism Identification: Methicillin resistant Staphylococcus aureus  Stenotrophomonas maltophilia    Organism: Methicillin resistant Staphylococcus aureus    Organism: Stenotrophomonas maltophilia    Method Type: AWILDA    Method Type: AWILDA            Radiology:  TTE no obvious vegetations     < from: CT Chest No Cont (08.31.17 @ 17:58) >  EXAM:  CT CHEST                            PROCEDURE DATE:  08/31/2017          INTERPRETATION:  CT CHEST    HISTORY:  respiratory failure                     TECHNIQUE: Noncontrast CT of the chest was performed. Coronal and   sagittal images were reconstructed. This study was performed using   automatic exposure control (radiation dose reduction software) to obtain   a diagnostic image quality scan with patient dose as low as reasonably   achievable.    COMPARISON: Chest x-ray from the same day    FINDINGS:    LUNGS, AIRWAYS: Tracheostomy in place. The central airways are patent.   Mild interstitial and alveolar pulmonary edema. Bibasilar compressive   atelectasis with near complete collapse of both lower lobes.    PLEURA: Moderate loculated right hydropneumothorax pleural thickening.   Moderate layering left pleural effusion.    HEART AND VESSELS: Status post CABG. Right IJ dialysis catheter tip in   the right atrium. Normal heart size. No pericardial effusion. Extensive   aortic valve and coronary calcification. Atherosclerotic thoracic aorta   is normal in caliber.    MEDIASTINUM AND VIKI: No adenopathy.    UPPER ABDOMEN: Small ascites. Gallstones. Percutaneous gastrostomy tube   in place. Splenectomy.    BONES AND SOFT TISSUES:Status post sternotomy. No acute bony abnormality.    IMPRESSION:     Mild pulmonary interstitial and alveolar edema.    Near-complete collapse of both lower lobes secondary to compression   atelectasis.    Moderate loculated right hydropneumothorax with associated pleural   thickening. Moderate layering left pleural effusion with associated   pleural thickening.      < end of copied text >      Advanced directives addressed: full resuscitation none

## 2021-06-14 NOTE — PHYSICAL THERAPY INITIAL EVALUATION ADULT - PHYSICAL ASSIST/NONPHYSICAL ASSIST: SIT/STAND, REHAB EVAL
6/14/2021 Patient: Francisco Salmeron YOB: 1933 Date of Visit: 6/14/2021 Lonnie Melgar MD 
[address] [place] [address] [phone] Via Fax: [phone] Dear Lonnie Melgar MD, Thank you for referring Mr. Khalida Trivedi to [address] [place] [address] for evaluation. My notes for this consultation are attached. If you have questions, please do not hesitate to call me. I look forward to following your patient along with you.  
 
 
Sincerely, 
 
Lavinia Khan MD 
 
 1 person + 1 person to manage equipment

## 2022-10-12 NOTE — ED ADULT NURSE NOTE - NS ED NURSE LEVEL OF CONSCIOUSNESS ORIENTATION
Impression: Tear film insufficiency of bilateral lacrimal glands: H04.123. Plan: Continue artificial tears QID. Oriented - self; Oriented - place; Oriented - time

## 2023-11-21 NOTE — PROGRESS NOTE ADULT - SUBJECTIVE AND OBJECTIVE BOX
HGB result reviewed, pt given Epogen without incident.  Confirmed pts next appt, declines AVS. PROCEDURE NOTE:    pt with difficulty ventilating intermittently with Bivona 7.0 in place    with pt supine, Portex 8.0 trach tube inserted without difficulty and secured in place    stylet removed and inner cannula inserted.   balloon inflated.  Pt connected to vent    Pt tolerated procedure without issue.    CXR ordered to confirm position.    Pt Sat 100% throughout procedure.

## 2025-07-14 NOTE — PHYSICAL THERAPY INITIAL EVALUATION ADULT - PHYSICAL ASSIST/NONPHYSICAL ASSIST: SCOOT/BRIDGE, REHAB EVAL
Caller: AilynshaiLeida    Relationship: Self    Best call back number:   Telephone Information:   Mobile [phone]     Requested Prescriptions:   Requested Prescriptions     Pending Prescriptions Disp Refills    propranolol LA (INDERAL LA) 60 MG 24 hr capsule 90 capsule 0     Sig: Take 1 capsule by mouth Daily.    sertraline (ZOLOFT) 100 MG tablet 30 tablet 5     Sig: Take 1 tablet by mouth Daily.        Pharmacy where request should be sent: [id number] - [place], [place] - [address] [place] [address] - [phone]  - [phone] FX     Last office visit with prescribing clinician: 10/18/2024   Last telemedicine visit with prescribing clinician: Visit date not found   Next office visit with prescribing clinician: Visit date not found     Additional details provided by patient:     Does the patient have less than a 3 day supply:  [x] Yes  [] No    Would you like a call back once the refill request has been completed: [] Yes [] No    If the office needs to give you a call back, can they leave a voicemail: [] Yes [] No    Yfn Molina Rep   07/14/25 09:44 EDT          2 person assist